# Patient Record
Sex: FEMALE | Race: OTHER | Employment: UNEMPLOYED | URBAN - METROPOLITAN AREA
[De-identification: names, ages, dates, MRNs, and addresses within clinical notes are randomized per-mention and may not be internally consistent; named-entity substitution may affect disease eponyms.]

---

## 2023-03-03 ENCOUNTER — INITIAL PRENATAL (OUTPATIENT)
Dept: OBGYN CLINIC | Age: 33
End: 2023-03-03

## 2023-03-03 VITALS
WEIGHT: 148.8 LBS | SYSTOLIC BLOOD PRESSURE: 127 MMHG | DIASTOLIC BLOOD PRESSURE: 85 MMHG | HEIGHT: 64 IN | BODY MASS INDEX: 25.4 KG/M2

## 2023-03-03 DIAGNOSIS — Z3A.33 33 WEEKS GESTATION OF PREGNANCY: Primary | ICD-10-CM

## 2023-03-03 DIAGNOSIS — R31.9 HEMATURIA, UNSPECIFIED TYPE: ICD-10-CM

## 2023-03-03 LAB
ANTIBODY SCREEN, EXTERNAL: NEGATIVE
HBSAG, EXTERNAL: NEGATIVE
HIV, EXTERNAL: NORMAL
RPR, EXTERNAL: NORMAL
RUBELLA, EXTERNAL: NORMAL
TYPE, ABO & RH, EXTERNAL: NORMAL

## 2023-03-03 RX ORDER — MULTIVITAMIN
1 TABLET ORAL DAILY
COMMUNITY

## 2023-03-03 NOTE — PROGRESS NOTES
Current pregnancy history:    Voncille Burkitt is a No obstetric history on file. ,  33 y.o. female / Patient's last menstrual period was 2022. .  She presents as transfer OB from [facility] and has previously been seen through the pregnancy with no problems thus far. Last Pap: has not had pap smear. LMP history:  The date of her LMP is 8969 fairly certain. Her last menstrual period was normal and lasted for 4 to 5 days. She was not on the pill at conception. Based on her LMP, her EDC is 2023 and her EGA is 33 weeks,1 days. Her menstrual cycles are regular and occur approximately every 28 days  and range from 3 to 5 days. The last menses lasted  the usual number of days. Ultrasound data:  She had an  ultrasound done by the ultrasound tech today which revealed a viable carvajal pregnancy with a gestational age of 29 weeks and 1 days giving an [address] of 2023. LIMITED OB SCAN  A SINGLE VERTEX 33W1D IUP IS SEEN. FETAL CARDIAC MOTION OBSERVED. LIMITED ANATOMY WAS VISUALIZED AND APPEARS WNL. APPROPRIATE FETAL GROWTH IS SEEN. SIZE=DATES. LION AND PLACENTA APPEAR WITHIN NORMAL LIMITS. Pregnancy symptoms:  She states she has had no problems with the pregnancy thus far. She has had several ultrasounds     She states she has gained weight:  Not sure of initial weight    Relevant past pregnancy history:   She has the following pregnancy history: Her last pregnancy was uncomplicated. She has no history of  delivery. Relevant past medical history:(relevant to this pregnancy): noncontributory. Her occupation is: unemployed. Substance history: negative for alcohol, tobacco and street drugs. Positive for nothing. Exposure history: There is/are no indoor cat/s in the home. The patient was instructed to not change the cat litter. She admits close contact with children on a regular basis.    She has had chicken pox or the vaccine in the past.   Patient denies issues with domestic violence. Genetic Screening/Teratology Counseling: (Includes patient, baby's father, or anyone in either family with:)  3.  Patient's age >/= [address] [facility]?-- no  .   2. Thalassemia ([facility] [place], [address], or  background): MCV<80?--no.     3.  Neural tube defect (meningomyelocele, spina bifida, anencephaly)?--no.   4.  Congenital heart defect?--no.  5.  Down syndrome?--no.   6.  Tom-S[employer]s ([employer] [place])?--no.   7.  Canavan's Disease?--no.   8.  Familial Dysautonomia?--no.   9.  Sickle cell disease or trait ()? --no   The patient has not been tested for sickle trait  10. Hemophilia or other blood disorders?--no. 11.  Muscular dystrophy?--no. 12.  Cystic fibrosis?--no. 13.  [place]'s Chorea?--no. 14.  Mental retardation/autism (if yes was person tested for Fragile X)?--no. 15.  Other inherited genetic or chromosomal disorder?--no. 12.  Maternal metabolic disorder (DM, PKU, etc)?--no. 17.  Patient or FOB with a child with a birth defect not listed above?--no.  17a. Patient or FOB with a birth defect themselves?--no. 18.  Recurrent pregnancy loss, or stillbirth?--no. 19.  Any medications since LMP other than prenatal vitamins (include vitamins, supplements, OTC meds, drugs, alcohol)?--no. 20.  Any other genetic/environmental exposure to discuss?--no. Infection History:  1. Lives with someone with TB or TB exposed?--no.   2.  Patient or partner has history of genital herpes?--no.  3.  Rash or viral illness since LMP?--no.    4.  History of STD (GC, CT, HPV, syphilis, HIV)? --no   5. Other: OTHER? Past Medical History:   Diagnosis Date    Anemia     Vitamin D deficiency      History reviewed. No pertinent surgical history.   Social History     Occupational History    Not on file   Tobacco Use    Smoking status: Never    Smokeless tobacco: Never   Vaping Use    Vaping Use: Never used   Substance and Sexual Activity Alcohol use: Not Currently     Alcohol/week: 1.0 standard drink     Types: 1 Glasses of wine per week    Drug use: Never    Sexual activity: Yes     Partners: Male     Birth control/protection: None     Family History   Problem Relation Age of Onset    Breast Cancer Mother 54     OB History    Para Term  AB Living   1 0 0 0 0 0   SAB IAB Ectopic Molar Multiple Live Births   0 0 0 0 0 0      # Outcome Date GA Lbr Jude/2nd Weight Sex Delivery Anes PTL Lv   1 Current              No Known Allergies  Prior to Admission medications    Medication Sig Start Date End Date Taking? Authorizing Provider   ferrous sulfate (IRON PO) Take  by mouth. Yes Provider, Historical   multivitamin (ONE A DAY) tablet Take 1 Tablet by mouth daily.    Yes Provider, Historical        Review of Systems: History obtained from the patient  Constitutional: negative for weight loss, fever, night sweats  HEENT: negative for hearing loss, earache, congestion, snoring, sore throat  CV: negative for chest pain, palpitations, edema  Resp: negative for cough, shortness of breath, wheezing  Breast: negative for breast lumps, nipple discharge, galactorrhea  GI: negative for change in bowel habits, abdominal pain, black or bloody stools  : negative for frequency, dysuria, hematuria, vaginal discharge  MSK: negative for back pain, joint pain, muscle pain  Skin: negative for itching, rash, hives  Neuro: negative for dizziness, headache, confusion, weakness  Psych: negative for anxiety, depression, change in mood  Heme/lymph: negative for bleeding, bruising, pallor    Objective:  Visit Vitals  /85   Wt 148 lb 12.8 oz (67.5 kg)   LMP 2022       Physical Exam:     Constitutional  Appearance: well-nourished, well developed, alert, in no acute distress    HENT  Head  Face: appears normal  Eyes: appear normal  Ears: normal  Mouth: normal  Lips: no lesions    Gastrointestinal  Abdominal Examination: abdomen non-tender to palpation, normal bowel sounds, no masses present, gravid with FH of 33  Liver and spleen: no hepatomegaly present, spleen not palpable  Hernias: no hernias identified    Skin  General Inspection: no rash, no lesions identified    Neurologic/Psychiatric  Mental Status:  Orientation: grossly oriented to person, place and time  Mood and Affect: mood normal, affect appropriate    Assessment:     ICD-10-CM ICD-9-CM    1. 33 weeks gestation of pregnancy  Z3A.33 V22.2 TYPE & SCREEN      HEP B SURFACE AG      HIV 1/2 AG/AB, 4TH GENERATION,W RFLX CONFIRM      CBC W/O DIFF      RUBELLA AB, IGG      RPR      VITAMIN D, 25 HYDROXY      HEPATITIS C AB      VITAMIN D, 25 HYDROXY      HEPATITIS C AB        Intrauterine pregnancy with the following problems identified:   Dating by LMP ~= 33 week US  Transfer care from [address] [place] [address]:     Course of pregnancy discussed including visit schedule, routine U/S, glucola testing, etc.  Avoid alcoholic beverages and illicit/recreational drugs use  Take prenatal vitamins or folic acid daily. Hospital and practice style discussed with coverage system. Discussed nutrition, toxoplasmosis precautions, sexual activity, exercise, need for influenza vaccine, environmental and work hazards, travel advice, screen for domestic violence, need for seat belts. Discussed seafood, unpasteurized dairy products, deli meat, artificial sweeteners, and caffeine. Information on prenatal classes/breastfeeding given. Information on circumcision given  Patient encouraged not to smoke. Discussed current prescription drug use. Given medication list.  Discussed the use of over the counter medications and chemicals. Route of delivery discussed, including risks, benefits, and alternatives of  versus repeat LTCS. Pt understands risk of hemorrhage during pregnancy and post delivery and would accept blood products if necessary in life-threatening emergencies  Return in about 2 weeks (around 3/17/2023). Handouts given to pt.

## 2023-03-03 NOTE — PROGRESS NOTES
Jens Dumas is a 33 y.o. female presents for a new pregnancy visit. Chief Complaint   Patient presents with    Initial Prenatal Visit         Patient's last menstrual period was 2022. Last Pap: has not had pap smear. LMP history:  The date of her LMP is  not  certain. Her last menstrual period was normal and lasted for 4 to 5 days. She was not on the pill at conception. Based on her LMP, her EDC is 2023 and her EGA is 33 weeks,1 days. Her menstrual cycles are regular and occur approximately every 28 days  and range from 3 to 5 days. The last menses lasted  the usual number of days. Ultrasound data:  She had an  ultrasound done by the ultrasound tech today which revealed a viable carvajal pregnancy with a gestational age of 29 weeks and 1 days giving an [facility] of 2023. LIMITED OB SCAN  A SINGLE VERTEX 33W1D IUP IS SEEN. FETAL CARDIAC MOTION OBSERVED. LIMITED ANATOMY WAS VISUALIZED AND APPEARS WNL. APPROPRIATE FETAL GROWTH IS SEEN. SIZE=DATES. LION AND PLACENTA APPEAR WITHIN NORMAL LIMITS. Pregnancy symptoms:    Since her LMP she has experienced  urinary frequency, breast tenderness, and nausea. She has not been vomiting over the last few weeks. Associated signs and symptoms which she denies: dysuria, discharge, vaginal bleeding. She states she has gained weight:  Approximately 5 pounds over the last few weeks. Relevant past pregnancy history:   She has the following pregnancy history:    She has no history of  delivery. Relevant past medical history:(relevant to this pregnancy): noncontributory. Her occupation is: homemaker. 1. Have you been to the ER, urgent care clinic, or hospitalized since your last visit? N/A NP    2. Have you seen or consulted any other health care providers outside of the [address] [place] [address] since your last visit?   N/A NP    Examination chaperoned by Halina Reyes MA.

## 2023-03-05 LAB
25(OH)D3+25(OH)D2 SERPL-MCNC: 46.6 NG/ML (ref 30–100)
ABO GROUP BLD: NORMAL
BLD GP AB SCN SERPL QL: NEGATIVE
ERYTHROCYTE [DISTWIDTH] IN BLOOD BY AUTOMATED COUNT: 13.4 % (ref 11.7–15.4)
HBV SURFACE AG SERPL QL IA: NEGATIVE
HCT VFR BLD AUTO: 38.5 % (ref 34–46.6)
HCV IGG SERPL QL IA: NON REACTIVE
HGB BLD-MCNC: 13 G/DL (ref 11.1–15.9)
HIV 1+2 AB+HIV1 P24 AG SERPL QL IA: NON REACTIVE
MCH RBC QN AUTO: 29.2 PG (ref 26.6–33)
MCHC RBC AUTO-ENTMCNC: 33.8 G/DL (ref 31.5–35.7)
MCV RBC AUTO: 87 FL (ref 79–97)
PLATELET # BLD AUTO: 242 X10E3/UL (ref 150–450)
RBC # BLD AUTO: 4.45 X10E6/UL (ref 3.77–5.28)
RH BLD: POSITIVE
RPR SER QL: NON REACTIVE
RUBV IGG SERPL IA-ACNC: 1.57 INDEX
WBC # BLD AUTO: 9.5 X10E3/UL (ref 3.4–10.8)

## 2023-03-21 ENCOUNTER — ROUTINE PRENATAL (OUTPATIENT)
Dept: OBGYN CLINIC | Age: 33
End: 2023-03-21
Payer: MEDICAID

## 2023-03-21 VITALS — BODY MASS INDEX: 25.75 KG/M2 | WEIGHT: 150 LBS | DIASTOLIC BLOOD PRESSURE: 79 MMHG | SYSTOLIC BLOOD PRESSURE: 127 MMHG

## 2023-03-21 DIAGNOSIS — Z3A.35 35 WEEKS GESTATION OF PREGNANCY: Primary | ICD-10-CM

## 2023-03-21 LAB — GRBS, EXTERNAL: NEGATIVE

## 2023-03-21 PROCEDURE — 0502F SUBSEQUENT PRENATAL CARE: CPT | Performed by: OBSTETRICS & GYNECOLOGY

## 2023-03-21 NOTE — PROGRESS NOTES
OB VISIT      Current EGA:   35w5d    Visit Notes:  Doing Well. +Fetal Movement. No Ucs. No LOF or VB. For GBS today  Total weight gain is 22 lb (9.979 kg). Problem List:  Dating by LMP ~= 33 week US  Transfer care from [place] [address] 33w1d = 34w3d growth 83% had    Visit Diagnoses:    ICD-10-CM ICD-9-CM    1. 35 weeks gestation of pregnancy  Z3A.35 V22.2 GROUP B STREP BY SARAH        Follow Up:  Return in about 1 week (around 3/28/2023) for Routine OB Visit.     Lucian Archer MD  03/21/23  10:58 AM

## 2023-03-23 LAB — GP B STREP DNA SPEC QL NAA+PROBE: NEGATIVE

## 2023-03-30 ENCOUNTER — ROUTINE PRENATAL (OUTPATIENT)
Dept: OBGYN CLINIC | Age: 33
End: 2023-03-30
Payer: MEDICAID

## 2023-03-30 VITALS — BODY MASS INDEX: 26.19 KG/M2 | DIASTOLIC BLOOD PRESSURE: 78 MMHG | SYSTOLIC BLOOD PRESSURE: 125 MMHG | WEIGHT: 152.6 LBS

## 2023-03-30 DIAGNOSIS — Z3A.37 37 WEEKS GESTATION OF PREGNANCY: Primary | ICD-10-CM

## 2023-03-30 PROCEDURE — 0502F SUBSEQUENT PRENATAL CARE: CPT | Performed by: OBSTETRICS & GYNECOLOGY

## 2023-03-30 NOTE — PROGRESS NOTES
Adult Self-Care for Colds  Colds are caused by viruses. They can't be cured with antibiotics. However, you can ease symptoms and support your body's efforts to heal itself.  No matter which symptoms you have, be sure to:  · Drink plenty of fluids (water or clear soup)  · Stop smoking and drinking alcohol  · Get plenty of rest    Understand a fever  · Take your temperature several times a day. If your fever is 100.4°F (38.0°C) for more than a day, call your healthcare provider.  · Relax, lie down. Go to bed if you want. Just get off your feet and rest. Also, drink plenty of fluids to avoid dehydration.  · Take acetaminophen or a nonsteroidal anti-inflammatory agent (NSAID), such as ibuprofen.  Treat a troubled nose kindly  · Breathe steam or heated humidified air to open blocked nasal passages.  a hot shower or use a vaporizer. Be careful not to get burned by the steam.  · Saline nasal sprays and decongestant tablets help open a stuffy nose. Antihistamines can also help, but they can cause side effects such as drowsiness and drying of the eyes, nose, and mouth.  Soothe a sore throat and cough  · Gargle every 2 hours with 1/4 teaspoon of salt dissolved in 1/2 cup of warm water. Suck on throat lozenges and cough drops to moisten your throat.  · Cough medicines are available but it is unclear how well they actually work.  · Take acetaminophen or an NSAID, such as ibuprofen, to ease throat pain  Ease digestive problems  · Put fluids back into your body. Take frequent sips of clear liquids such as water or broth. Avoid drinks that have a lot of sugar in them, such as juices and sodas. These can make diarrhea worse. Older children and adults can drink sports drinks.  · As your appetite returns, you can resume your normal diet. Ask your healthcare provider if there are any foods you should avoid.  When to seek medical care  When you first notice symptoms, ask your healthcare provider if antiviral medicines are  OB VISIT      Current EGA:   37w0d    Visit Notes:  Doing Well. +Fetal Movement. No Ucs. No LOF or VB. Total weight gain is 22 lb (9.979 kg). Problem List:   Dating by LMP ~= 33 week US   Transfer care from [facility]. 37w still no records available labs were done here  [address] 33w1d = 34w3d growth 83% had    Visit Diagnoses:    ICD-10-CM ICD-9-CM    1. 37 weeks gestation of pregnancy  Z3A.37 V22.2         Follow Up:  Return in about 1 week (around 4/6/2023) for Routine OB Visit.     Kenyon Rivera MD  03/30/23  11:17 AM appropriate. Antibiotics should not be taken for colds or flu. Also, call your healthcare provider if you have any of the following symptoms or if you aren't feeling better after 7 days:  · Shortness of breath  · Pain or pressure in the chest or belly (abdomen)  · Worsening symptoms, especially after a period of improvement  · Fever of 100.4°F  (38.0°C) or higher, or fever that doesn't go down with medicine  · Sudden dizziness or confusion  · Severe or continued vomiting  · Signs of dehydration, including extreme thirst, dark urine, infrequent urination, dry mouth  · Spotted, red, or very sore throat   Date Last Reviewed: 12/1/2016  © 6887-8305 [employer]. [address] [place] [address], [place] [address]. All rights reserved. This information is not intended as a substitute for professional medical care. Always follow your healthcare professional's instructions.        How to Quit Smoking  Smoking is one of the hardest habits to break. About half of all people who have ever smoked have been able to quit. Most people who still smoke want to quit. Here are some of the best ways to stop smoking.    Keep trying  Most smokers make many attempts at quitting before they are successful. Its important not to give up.  Go cold turkey  Most former smokers quit cold turkey (all at once). Trying to cut back gradually doesn't seem to work as well, perhaps because it continues the smoking habit. Also, it is possible to inhale more while smoking fewer cigarettes. This results in the same amount of nicotine in your body.  Get support  Support programs can be a big help, especially for heavy smokers. These groups offer lectures, ways to change behavior, and peer support. Here are some ways to find a support program:  · Free national quitline: 800-QUIT-NOW (501-299-4500).  · Hospital quit-smoking programs.  · American Lung Association: (374.971.9588).  · American Cancer Society (258-838-2539).  Support at home is important  "too. Nonsmokers can offer praise and encouragement. If the smoker in your life finds it hard to quit, encourage them to keep trying.  Over-the-counter medicines  Nicotine replacement therapy may make quitting easier. Certain aids, such as the nicotine patch, gum, and lozenges, are available without a prescription. It is best to use these under a doctors care, though. The skin patch provides a steady supply of nicotine. Nicotine gum and lozenges give temporary bursts of low levels of nicotine. Both methods reduce the craving for cigarettes. Warning: If you have nausea, vomiting, dizziness, weakness, or a fast heartbeat, stop using these products and see your doctor.  Prescription medicines  After reviewing your smoking patterns and past attempts to quit, your doctor may offer a prescription medicine such as bupropion, varenicline, a nicotine inhaler, or nasal spray. Each has advantages and side effects. Your doctor can review these with you.  Health benefits of quitting  The benefits of quitting start right away and keep improving the longer you go without smoking. These benefits occur at any age.  So whether you are 17 or 70, quitting is a good decision. Some of the benefits include:  · 20 minutes: Blood pressure and pulse return to normal.  · 8 hours: Oxygen levels return to normal.  · 2 days: Ability to smell and taste begin to improve as damaged nerves regrow.  · 2 to 3 weeks: Circulation and lung function improve.  · 1 to 9 months: Coughing, congestion, and shortness of breath decrease; tiredness decreases.  · 1 year: Risk of heart attack decreases by half.  · 5 years: Risk of lung cancer decreases by half; risk of stroke becomes the same as a nonsmokers.  For more on how to quit smoking, try these online resources:   · Smokefree.gov  · "Clearing the Air" booklet from the National Cancer [place]: smokefree.gov/sites/default/files/pdf/clearing-the-air-accessible.pdf  Date Last Reviewed: 3/1/2017  © 0999-6182 " [employer]. [address] [place] [address], [place] [address]. All rights reserved. This information is not intended as a substitute for professional medical care. Always follow your healthcare professional's instructions.

## 2023-04-06 ENCOUNTER — ROUTINE PRENATAL (OUTPATIENT)
Dept: OBGYN CLINIC | Age: 33
End: 2023-04-06
Payer: MEDICAID

## 2023-04-06 PROCEDURE — 0502F SUBSEQUENT PRENATAL CARE: CPT | Performed by: OBSTETRICS & GYNECOLOGY

## 2023-04-06 NOTE — PROGRESS NOTES
OB VISIT      Current EGA:   38w0d    Visit Notes:  Doing Well. +Fetal Movement. No Ucs. No LOF or VB. Having some pelvic pressure. Total weight gain is 25 lb 9.6 oz (11.6 kg). Problem List:  Dating by LMP ~= 33 week US  Transfer care from [facility]. 37w still no records available labs were done here  [place] - [address] 33w1d = 34w3d growth 83% had    Visit Diagnoses:    ICD-10-CM ICD-9-CM    1. 38 weeks gestation of pregnancy  Z3A.38 V22.2         Follow Up:  Return in about 1 week (around 4/13/2023) for Routine OB Visit, OB Ultrasound.     Ioana Landis MD  04/06/23  4:04 PM

## 2023-04-17 ENCOUNTER — HOSPITAL ENCOUNTER (EMERGENCY)
Age: 33
Discharge: HOME | DRG: 560 | End: 2023-04-17
Attending: OBSTETRICS & GYNECOLOGY | Admitting: OBSTETRICS & GYNECOLOGY
Payer: MEDICAID

## 2023-04-17 VITALS — BODY MASS INDEX: 25.78 KG/M2 | HEIGHT: 64 IN | WEIGHT: 151 LBS

## 2023-04-17 PROCEDURE — 99283 EMERGENCY DEPT VISIT LOW MDM: CPT

## 2023-04-17 NOTE — ED PROVIDER NOTES
NIKO Evaluation    Name: Ling Martin MRN: [id number]  SSN: [id number]    YOB: 1990  Age: 33 y.o. Sex: female      Subjective:     Reason for Admission:  Pregnancy and spotting    History of Present Illness: Ling Martin is a 33 y.o.  female with an estimated gestational age of 43w3d with Estimated Date of Delivery: 23. Patient complains of spotting at home a few hours ago. Denied any heavy bleeding, contractions, SROM, nausea, or vomiting. OB History          1    Para   0    Term   0       0    AB   0    Living   0         SAB   0    IAB   0    Ectopic   0    Molar   0    Multiple   0    Live Births   0              Past Medical History:   Diagnosis Date    Anemia     Vitamin D deficiency      History reviewed. No pertinent surgical history. Social History     Occupational History    Not on file   Tobacco Use    Smoking status: Never    Smokeless tobacco: Never   Vaping Use    Vaping Use: Never used   Substance and Sexual Activity    Alcohol use: Not Currently     Alcohol/week: 1.0 standard drink     Types: 1 Glasses of wine per week    Drug use: Never    Sexual activity: Yes     Partners: Male     Birth control/protection: None     Family History   Problem Relation Age of Onset    Breast Cancer Mother 54       No Known Allergies  Prior to Admission medications    Medication Sig Start Date End Date Taking? Authorizing Provider   ferrous sulfate (IRON PO) Take  by mouth. Yes Provider, Historical   multivitamin (ONE A DAY) tablet Take 1 Tablet by mouth daily. Yes Provider, Historical        Review of Systems-see HPI    Objective:     Vitals:    Vitals:    23   Weight: 68.5 kg (151 lb)   Height: 5' 4\" (1.626 m)      No data recorded.     No data recorded     Physical Exam  Vagina- no blood  Cervical Exam: 0 cm dilated    60% effaced    0 station    Presenting Part: cephalic  Cervical Position: posterior  Uterine Activity: None  Membranes: Intact  Fetal Heart Rate: Reactive       Labs: No results found for this or any previous visit (from the past 24 hour(s)). There is no problem list on file for this patient. Assessment and Plan:   Impression: False labor at 39 weeks 4 days. No evidence of bleeding. Plan: Discharge home with followup this week with Dr. Merry Epley.         Signed By:  Shawn Hamilton MD     April 17, 2023

## 2023-04-18 ENCOUNTER — ANESTHESIA (OUTPATIENT)
Dept: LABOR AND DELIVERY | Age: 33
End: 2023-04-18
Payer: MEDICAID

## 2023-04-18 ENCOUNTER — HOSPITAL ENCOUNTER (INPATIENT)
Age: 33
LOS: 3 days | Discharge: HOME | DRG: 560 | End: 2023-04-21
Attending: OBSTETRICS & GYNECOLOGY | Admitting: OBSTETRICS & GYNECOLOGY
Payer: MEDICAID

## 2023-04-18 ENCOUNTER — ANESTHESIA EVENT (OUTPATIENT)
Dept: LABOR AND DELIVERY | Age: 33
End: 2023-04-18
Payer: MEDICAID

## 2023-04-18 ENCOUNTER — TELEPHONE (OUTPATIENT)
Dept: OBGYN CLINIC | Age: 33
End: 2023-04-18

## 2023-04-18 LAB
BASOPHILS # BLD: 0 K/UL (ref 0–0.1)
BASOPHILS NFR BLD: 0 % (ref 0–1)
DIFFERENTIAL METHOD BLD: ABNORMAL
EOSINOPHIL # BLD: 0 K/UL (ref 0–0.4)
EOSINOPHIL NFR BLD: 0 % (ref 0–7)
ERYTHROCYTE [DISTWIDTH] IN BLOOD BY AUTOMATED COUNT: 14 % (ref 11.5–14.5)
HCT VFR BLD AUTO: 36.8 % (ref 35–47)
HGB BLD-MCNC: 12.5 G/DL (ref 11.5–16)
IMM GRANULOCYTES # BLD AUTO: 0.1 K/UL (ref 0–0.04)
IMM GRANULOCYTES NFR BLD AUTO: 1 % (ref 0–0.5)
LYMPHOCYTES # BLD: 0.9 K/UL (ref 0.8–3.5)
LYMPHOCYTES NFR BLD: 7 % (ref 12–49)
MCH RBC QN AUTO: 29.5 PG (ref 26–34)
MCHC RBC AUTO-ENTMCNC: 34 G/DL (ref 30–36.5)
MCV RBC AUTO: 86.8 FL (ref 80–99)
MONOCYTES # BLD: 0.4 K/UL (ref 0–1)
MONOCYTES NFR BLD: 3 % (ref 5–13)
NEUTS SEG # BLD: 10.8 K/UL (ref 1.8–8)
NEUTS SEG NFR BLD: 89 % (ref 32–75)
NRBC # BLD: 0 K/UL (ref 0–0.01)
NRBC BLD-RTO: 0 PER 100 WBC
PLATELET # BLD AUTO: 195 K/UL (ref 150–400)
PMV BLD AUTO: 11.3 FL (ref 8.9–12.9)
RBC # BLD AUTO: 4.24 M/UL (ref 3.8–5.2)
WBC # BLD AUTO: 12.2 K/UL (ref 3.6–11)

## 2023-04-18 PROCEDURE — 59025 FETAL NON-STRESS TEST: CPT

## 2023-04-18 PROCEDURE — 99284 EMERGENCY DEPT VISIT MOD MDM: CPT

## 2023-04-18 PROCEDURE — 74011250637 HC RX REV CODE- 250/637: Performed by: ADVANCED PRACTICE MIDWIFE

## 2023-04-18 PROCEDURE — 74011000250 HC RX REV CODE- 250: Performed by: NURSE ANESTHETIST, CERTIFIED REGISTERED

## 2023-04-18 PROCEDURE — 36415 COLL VENOUS BLD VENIPUNCTURE: CPT

## 2023-04-18 PROCEDURE — 77030014125 HC TY EPDRL BBMI -B: Performed by: ANESTHESIOLOGY

## 2023-04-18 PROCEDURE — 75410000002 HC LABOR FEE PER 1 HR: Performed by: OBSTETRICS & GYNECOLOGY

## 2023-04-18 PROCEDURE — 65270000029 HC RM PRIVATE

## 2023-04-18 PROCEDURE — 85025 COMPLETE CBC W/AUTO DIFF WBC: CPT

## 2023-04-18 PROCEDURE — 74011250636 HC RX REV CODE- 250/636: Performed by: OBSTETRICS & GYNECOLOGY

## 2023-04-18 PROCEDURE — 74011250636 HC RX REV CODE- 250/636: Performed by: ADVANCED PRACTICE MIDWIFE

## 2023-04-18 PROCEDURE — 00HU33Z INSERTION OF INFUSION DEVICE INTO SPINAL CANAL, PERCUTANEOUS APPROACH: ICD-10-PCS | Performed by: ANESTHESIOLOGY

## 2023-04-18 RX ORDER — BUPIVACAINE HYDROCHLORIDE 2.5 MG/ML
INJECTION, SOLUTION EPIDURAL; INFILTRATION; INTRACAUDAL AS NEEDED
Status: DISCONTINUED | OUTPATIENT
Start: 2023-04-18 | End: 2023-04-19 | Stop reason: HOSPADM

## 2023-04-18 RX ORDER — OXYTOCIN/RINGER'S LACTATE 30/500 ML
0-20 PLASTIC BAG, INJECTION (ML) INTRAVENOUS
Status: DISCONTINUED | OUTPATIENT
Start: 2023-04-18 | End: 2023-04-21 | Stop reason: HOSPADM

## 2023-04-18 RX ORDER — LIDOCAINE HYDROCHLORIDE AND EPINEPHRINE 15; 5 MG/ML; UG/ML
INJECTION, SOLUTION EPIDURAL AS NEEDED
Status: DISCONTINUED | OUTPATIENT
Start: 2023-04-18 | End: 2023-04-19 | Stop reason: HOSPADM

## 2023-04-18 RX ORDER — MAG HYDROX/ALUMINUM HYD/SIMETH 200-200-20
30 SUSPENSION, ORAL (FINAL DOSE FORM) ORAL
Status: DISCONTINUED | OUTPATIENT
Start: 2023-04-18 | End: 2023-04-19 | Stop reason: HOSPADM

## 2023-04-18 RX ORDER — NALOXONE HYDROCHLORIDE 0.4 MG/ML
0.4 INJECTION, SOLUTION INTRAMUSCULAR; INTRAVENOUS; SUBCUTANEOUS AS NEEDED
Status: DISCONTINUED | OUTPATIENT
Start: 2023-04-18 | End: 2023-04-19 | Stop reason: HOSPADM

## 2023-04-18 RX ORDER — SWAB
1 SWAB, NON-MEDICATED MISCELLANEOUS DAILY
COMMUNITY

## 2023-04-18 RX ORDER — OXYTOCIN/RINGER'S LACTATE 30/500 ML
10 PLASTIC BAG, INJECTION (ML) INTRAVENOUS AS NEEDED
Status: DISCONTINUED | OUTPATIENT
Start: 2023-04-18 | End: 2023-04-21 | Stop reason: HOSPADM

## 2023-04-18 RX ORDER — ACETAMINOPHEN 325 MG/1
650 TABLET ORAL
Status: DISCONTINUED | OUTPATIENT
Start: 2023-04-18 | End: 2023-04-21 | Stop reason: HOSPADM

## 2023-04-18 RX ORDER — FENTANYL/BUPIVACAINE/NS/PF 2-1250MCG
10 SYRINGE (ML) EPIDURAL CONTINUOUS
Status: DISCONTINUED | OUTPATIENT
Start: 2023-04-18 | End: 2023-04-19 | Stop reason: HOSPADM

## 2023-04-18 RX ORDER — SODIUM CHLORIDE, SODIUM LACTATE, POTASSIUM CHLORIDE, CALCIUM CHLORIDE 600; 310; 30; 20 MG/100ML; MG/100ML; MG/100ML; MG/100ML
125 INJECTION, SOLUTION INTRAVENOUS CONTINUOUS
Status: DISCONTINUED | OUTPATIENT
Start: 2023-04-18 | End: 2023-04-21 | Stop reason: HOSPADM

## 2023-04-18 RX ORDER — OXYTOCIN/RINGER'S LACTATE 30/500 ML
87.3 PLASTIC BAG, INJECTION (ML) INTRAVENOUS AS NEEDED
Status: DISCONTINUED | OUTPATIENT
Start: 2023-04-18 | End: 2023-04-21 | Stop reason: HOSPADM

## 2023-04-18 RX ORDER — ONDANSETRON 2 MG/ML
4 INJECTION INTRAMUSCULAR; INTRAVENOUS
Status: DISCONTINUED | OUTPATIENT
Start: 2023-04-18 | End: 2023-04-19 | Stop reason: HOSPADM

## 2023-04-18 RX ORDER — ACETAMINOPHEN 500 MG
TABLET ORAL DAILY
COMMUNITY

## 2023-04-18 RX ORDER — EPHEDRINE SULFATE/0.9% NACL/PF 50 MG/5 ML
10 SYRINGE (ML) INTRAVENOUS
Status: DISCONTINUED | OUTPATIENT
Start: 2023-04-18 | End: 2023-04-19 | Stop reason: HOSPADM

## 2023-04-18 RX ADMIN — Medication 10 ML/HR: at 16:24

## 2023-04-18 RX ADMIN — OXYTOCIN 2 MILLI-UNITS/MIN: 10 INJECTION INTRAVENOUS at 22:36

## 2023-04-18 RX ADMIN — SODIUM CHLORIDE, POTASSIUM CHLORIDE, SODIUM LACTATE AND CALCIUM CHLORIDE 999 ML: 600; 310; 30; 20 INJECTION, SOLUTION INTRAVENOUS at 14:55

## 2023-04-18 RX ADMIN — SODIUM CHLORIDE, POTASSIUM CHLORIDE, SODIUM LACTATE AND CALCIUM CHLORIDE 999 ML/HR: 600; 310; 30; 20 INJECTION, SOLUTION INTRAVENOUS at 16:00

## 2023-04-18 RX ADMIN — ACETAMINOPHEN 650 MG: 325 TABLET ORAL at 21:46

## 2023-04-18 RX ADMIN — LIDOCAINE HYDROCHLORIDE AND EPINEPHRINE 3 ML: 15; 5 INJECTION, SOLUTION EPIDURAL at 15:49

## 2023-04-18 RX ADMIN — BUPIVACAINE HYDROCHLORIDE 6 ML: 2.5 INJECTION, SOLUTION EPIDURAL; INFILTRATION; INTRACAUDAL; PERINEURAL at 15:53

## 2023-04-18 NOTE — PROGRESS NOTES
1900: SBAR report given to this RN and Sadaf KOLB per [employer]. This RN orienting Sadaf KOLB. 2130: RN updating Oscar MARVIN on pt recent temperature of 99.7.

## 2023-04-18 NOTE — PROGRESS NOTES
1931- pt presents with c/o spotting on toilet tissue when using restroom at 1900. Not active bleeding, pt used restroom here and no bleeding on toilet tissue  1950- Dr. Jayne Duarte notified of pt arrival   2016- I have reviewed discharge instructions with the patient. The patient verbalized understanding.

## 2023-04-18 NOTE — ANESTHESIA PREPROCEDURE EVALUATION
Relevant Problems   No relevant active problems       Anesthetic History   No history of anesthetic complications            Review of Systems / Medical History  Patient summary reviewed, nursing notes reviewed and pertinent labs reviewed    Pulmonary  Within defined limits                 Neuro/Psych   Within defined limits           Cardiovascular  Within defined limits                Exercise tolerance: >4 METS     GI/Hepatic/Renal  Within defined limits              Endo/Other        Anemia     Other Findings              Physical Exam    Airway  Mallampati: II  TM Distance: < 4 cm  Neck ROM: normal range of motion   Mouth opening: Normal     Cardiovascular    Rhythm: regular           Dental  No notable dental hx       Pulmonary  Breath sounds clear to auscultation               Abdominal         Other Findings            Anesthetic Plan    ASA: 2  Anesthesia type: epidural      Post-op pain plan if not by surgeon: indwelling epidural catheter      Anesthetic plan and risks discussed with: Patient      Late entry - preop done, questions answered, and consent obtained prior to procedure; note entered after procedure at 4:05 PM

## 2023-04-18 NOTE — TELEPHONE ENCOUNTER
Two patient identifiers used      33year old patient  39w5d pregnant    Patient was seen in the er last night for vaginal bleeding and sent home    Patient reports the vaginal bleeding is heavier and she is having pressure /pain in her lower pelvis    This nurse described contractions and patient not sure if she is having contraction pain , but reports coming every 6-10 minutes and she rates them at 7-8 on the pain scale of 1-10    Patient denies ROM, and is feeling the baby move    Dr Pamella Stafford reviewed er notes and patient was advised to come to the office at 4;00PM and was placed on the scheduled     Patient to increase her po fluids and monitor for contractions    Patient verbalized understanding

## 2023-04-18 NOTE — TELEPHONE ENCOUNTER
Pt calling back at 1314 to report that she is now having contractions every 3-4 minutes. Dr Aaliyah Gonzalez was informed and he states that he wants there to go to L&D now. Pt was made aware and had no further questions.

## 2023-04-18 NOTE — ANESTHESIA PROCEDURE NOTES
Epidural Block    Patient location during procedure: OB  Start time: 4/18/2023 3:35 PM  End time: 4/18/2023 3:54 PM  Reason for block: labor epidural  Staffing  Performed: CRNA   Resident/CRNA: Drew Pardo CRNA  Preanesthetic Checklist  Completed: patient identified, IV checked, risks and benefits discussed, monitors and equipment checked, pre-op evaluation and timeout performed  Block Placement  Patient position: sitting  Prep: DuraPrep  Sterility prep: cap, drape, mask and gloves  Sedation level: no sedation  Patient monitoring: continuous pulse oximetry, ETCO2 and heart rate  Approach: midline  Location: lumbar  Lumbar location: L2-L3  Epidural  Loss of resistance technique: air  Guidance: landmark technique  Needle  Needle type: Tuohy   Needle gauge: 17 G  Needle length: 10 cm  Needle insertion depth: 7 cm  Catheter size: 20 G  Catheter at skin depth: 12 cm  Catheter securement method: clear occlusive dressing and surgical tape  Test dose: negative  Assessment  Number of attempts: 2  Additional Notes  Risks and benefits of epidural discussed with patient prior to placement. Patient verbalized understanding of risks for bleeding, PDPH, infection and misplaced catheter. States she wishes to proceed with placement. Tolerated procedure well. First attempt threaded catheter to return blood. Pulled and retried up one level.

## 2023-04-18 NOTE — PROGRESS NOTES
1400: Pt arrived to L&D room 209 for complaints of contractions that began today around 0100 and have been every 5 minutes, currently 8-9/10 pain in lower abdomen. Reports vaginal bleeding for which she has had to change her pad twice, none noted on current chux pad. Denies LOF, HA, vision changes. 1410: SVE per this RN, 3-4/80/-2. Dr. Gino Amaya updated, Norberto Valecnia to admit pt for labor. 32 61 16: Brianne CRNA at bedside for epidural placement, pt positioned on side of bed    1538: Timeout    1552: Test dose     1715: SVE per Dr. Gino Amaya, 5/90/-2.     1910: Bedside and Verbal shift change report given to Deb/Yamilex RN (oncoming nurse) by LEEANNE Granda/Aleksandr RN (offgoing nurse). Report included the following information SBAR, Kardex, Procedure Summary, Intake/Output, MAR, Accordion, Recent Results, and Med Rec Status.

## 2023-04-19 PROBLEM — Z3A.39 39 WEEKS GESTATION OF PREGNANCY: Status: ACTIVE | Noted: 2023-04-18

## 2023-04-19 PROCEDURE — 75410000002 HC LABOR FEE PER 1 HR: Performed by: OBSTETRICS & GYNECOLOGY

## 2023-04-19 PROCEDURE — 74011000250 HC RX REV CODE- 250: Performed by: NURSE ANESTHETIST, CERTIFIED REGISTERED

## 2023-04-19 PROCEDURE — 75410000003 HC RECOV DEL/VAG/CSECN EA 0.5 HR: Performed by: OBSTETRICS & GYNECOLOGY

## 2023-04-19 PROCEDURE — 65270000029 HC RM PRIVATE

## 2023-04-19 PROCEDURE — 77010026065 HC OXYGEN MINIMUM MEDICAL AIR: Performed by: OBSTETRICS & GYNECOLOGY

## 2023-04-19 PROCEDURE — 74011250637 HC RX REV CODE- 250/637: Performed by: OBSTETRICS & GYNECOLOGY

## 2023-04-19 PROCEDURE — 4A1HXCZ MONITORING OF PRODUCTS OF CONCEPTION, CARDIAC RATE, EXTERNAL APPROACH: ICD-10-PCS | Performed by: OBSTETRICS & GYNECOLOGY

## 2023-04-19 PROCEDURE — 75410000000 HC DELIVERY VAGINAL/SINGLE: Performed by: OBSTETRICS & GYNECOLOGY

## 2023-04-19 PROCEDURE — 59410 OBSTETRICAL CARE: CPT | Performed by: OBSTETRICS & GYNECOLOGY

## 2023-04-19 PROCEDURE — 74011250636 HC RX REV CODE- 250/636: Performed by: ADVANCED PRACTICE MIDWIFE

## 2023-04-19 PROCEDURE — 0KQM0ZZ REPAIR PERINEUM MUSCLE, OPEN APPROACH: ICD-10-PCS | Performed by: OBSTETRICS & GYNECOLOGY

## 2023-04-19 PROCEDURE — 76060000078 HC EPIDURAL ANESTHESIA: Performed by: NURSE ANESTHETIST, CERTIFIED REGISTERED

## 2023-04-19 PROCEDURE — 10907ZC DRAINAGE OF AMNIOTIC FLUID, THERAPEUTIC FROM PRODUCTS OF CONCEPTION, VIA NATURAL OR ARTIFICIAL OPENING: ICD-10-PCS | Performed by: OBSTETRICS & GYNECOLOGY

## 2023-04-19 RX ORDER — PROMETHAZINE HYDROCHLORIDE 25 MG/1
25 TABLET ORAL
Status: DISCONTINUED | OUTPATIENT
Start: 2023-04-19 | End: 2023-04-21 | Stop reason: HOSPADM

## 2023-04-19 RX ORDER — OXYTOCIN/RINGER'S LACTATE 30/500 ML
87.3 PLASTIC BAG, INJECTION (ML) INTRAVENOUS AS NEEDED
Status: DISCONTINUED | OUTPATIENT
Start: 2023-04-19 | End: 2023-04-21 | Stop reason: HOSPADM

## 2023-04-19 RX ORDER — DIPHENHYDRAMINE HCL 25 MG
25 CAPSULE ORAL
Status: DISCONTINUED | OUTPATIENT
Start: 2023-04-19 | End: 2023-04-21 | Stop reason: HOSPADM

## 2023-04-19 RX ORDER — ACETAMINOPHEN 325 MG/1
650 TABLET ORAL
Status: DISCONTINUED | OUTPATIENT
Start: 2023-04-19 | End: 2023-04-19 | Stop reason: SDUPTHER

## 2023-04-19 RX ORDER — HYDROCODONE BITARTRATE AND ACETAMINOPHEN 7.5; 325 MG/1; MG/1
1 TABLET ORAL
Status: DISCONTINUED | OUTPATIENT
Start: 2023-04-19 | End: 2023-04-21 | Stop reason: HOSPADM

## 2023-04-19 RX ORDER — OXYTOCIN/RINGER'S LACTATE 30/500 ML
10 PLASTIC BAG, INJECTION (ML) INTRAVENOUS AS NEEDED
Status: DISCONTINUED | OUTPATIENT
Start: 2023-04-19 | End: 2023-04-21 | Stop reason: HOSPADM

## 2023-04-19 RX ORDER — FOLIC ACID/MULTIVIT,IRON,MINER 0.4MG-18MG
1 TABLET ORAL DAILY
Status: DISCONTINUED | OUTPATIENT
Start: 2023-04-19 | End: 2023-04-21 | Stop reason: HOSPADM

## 2023-04-19 RX ORDER — NALOXONE HYDROCHLORIDE 0.4 MG/ML
0.4 INJECTION, SOLUTION INTRAMUSCULAR; INTRAVENOUS; SUBCUTANEOUS AS NEEDED
Status: DISCONTINUED | OUTPATIENT
Start: 2023-04-19 | End: 2023-04-21 | Stop reason: HOSPADM

## 2023-04-19 RX ORDER — IBUPROFEN 800 MG/1
800 TABLET ORAL EVERY 8 HOURS
Status: DISCONTINUED | OUTPATIENT
Start: 2023-04-19 | End: 2023-04-21 | Stop reason: HOSPADM

## 2023-04-19 RX ORDER — MINERAL OIL
OIL (ML) ORAL
Status: DISPENSED
Start: 2023-04-19 | End: 2023-04-19

## 2023-04-19 RX ORDER — HYDROCORTISONE ACETATE PRAMOXINE HCL 2.5; 1 G/100G; G/100G
CREAM TOPICAL AS NEEDED
Status: DISCONTINUED | OUTPATIENT
Start: 2023-04-19 | End: 2023-04-19 | Stop reason: RX

## 2023-04-19 RX ORDER — ZOLPIDEM TARTRATE 5 MG/1
5 TABLET ORAL
Status: DISCONTINUED | OUTPATIENT
Start: 2023-04-19 | End: 2023-04-21 | Stop reason: HOSPADM

## 2023-04-19 RX ADMIN — Medication 10 ML/HR: at 00:57

## 2023-04-19 RX ADMIN — IBUPROFEN 800 MG: 800 TABLET, FILM COATED ORAL at 10:01

## 2023-04-19 RX ADMIN — OXYTOCIN 6 MILLI-UNITS/MIN: 10 INJECTION INTRAVENOUS at 00:25

## 2023-04-19 RX ADMIN — Medication 1 TABLET: at 10:01

## 2023-04-19 RX ADMIN — IBUPROFEN 800 MG: 800 TABLET, FILM COATED ORAL at 17:43

## 2023-04-19 NOTE — L&D DELIVERY NOTE
Delivery Summary    Patient: Heber Melchor MRN: [id number]  SSN: [id number]    YOB: 1990  Age: 33 y.o. Sex: female      Baby noted in MELISSA position +2 station. Patient had pushed nearly 4 hours and was exhausted. Options discussed vacuum vs. C/Section. Risks of hematomas, lacerations with vacuum reviewed. Parents agreed to trial of vacuum. Attempts were made to drain bladder but head was too low. Pt delivered in 2 Ucs (4 pulls) with no popoffs. Total application time of vacuum 100s. Delivery was successful with no visible scalp lacerations. [facility] staff was present. 2nd degree laceration resulted and was repaired with 3-0 vicryl. No complications resulted.       Vacuum was applied  Information for the patient's :  Bear White [[id number]]     Labor Events:    Labor: No    Steroids: None   Cervical Ripening Date/Time:       Cervical Ripening Type: None   Antibiotics During Labor: No   Rupture Identifier:      Rupture Date/Time: 2023 1:10 AM   Rupture Type: AROM   Amniotic Fluid Volume:      Amniotic Fluid Description: Clear    Amniotic Fluid Odor:      Induction: None       Induction Date/Time:        Indications for Induction:      Augmentation: AROM   Augmentation Date/Time: :10 AM   Indications for Augmentation: Ineffective Contraction Pattern   Labor complications: None       Additional complications:        Delivery Events:  Indications For Episiotomy:     Episiotomy: None   Perineal Laceration(s): 2nd   Repaired: Yes   Periurethral Laceration Location:      Repaired:     Labial Laceration Location:     Repaired:     Sulcal Laceration Location:     Repaired:     Vaginal Laceration Location:     Repaired:     Cervical Laceration Location:     Repaired:     Repair Suture: Vicryl 3-0   Number of Repair Packets: 1   Estimated Blood Loss (ml):  ml   Quantitative Blood Loss (ml):                Delivery Date: 2023    Delivery Time: 8:22 AM    Delivery Type: Vaginal, Vacuum (Extractor)  Vacuum attempted? No    Vacuum indication: Prolonged Labor   Vacuum type: Kiwi   Application location: Low   First Attempt     Time applied: 2023  8:18 AM   Time removed: 2023  8:22 AM   Second Attempt    Time applied:     Time removed: Third Attempt    Time applied:     Time removed:     Number of pulls: 4   Number of pop-offs: 0   Low-end pressure range:     High-end pressure range: Total application time: 632 seconds   Applied by: Jaylene White MD   Failed? No     Sex:  Male     Gestational Age: 37w11d  Delivery Clinician:  Noni Casarez Ii  Living Status: Living   Delivery Location: L&D            APGARS  One minute Five minutes Ten minutes   Skin color: 1   1        Heart rate: 2   2        Grimace: 2   2        Muscle tone: 2   2        Breathin   2        Totals: 9   9          Presentation: Vertex    Position: Left Occiput Anterior  Resuscitation Method:  Suctioning-bulb; Tactile Stimulation     Meconium Stained: Terminal      Cord Information: 3 Vessels  Complications: Nuchal Cord With Compressions  Cord around: head  Delayed cord clamping? Yes  Cord clamped date/time:2023  8:23 AM  Disposition of Cord Blood: Discard    Blood Gases Sent?: No    Placenta:  Date/Time: 2023  8:34 AM  Removal: Expressed      Appearance: Normal     Corder Measurements:  Birth Weight: 8 lb 1.1 oz (3.66 kg)      Birth Length: 1' 8.5\" (0.521 m)      Head Circumference: 1' 2.57\" (0.37 m)      Chest Circumference: 1' 1.78\" (0.35 m)     Abdominal Girth: 1' 1.39\" (0.34 m)    Other Providers:   IDANIA CHEW II;YOJANA COLLAZO;OLGA TILLMAN;YOJANA COLLAZO;NELA MARIE;ARTEM LAU;CASSY BRADFORD, Midwife;Primary Nurse;Primary Corder Nurse;Primary Nurse;Charge Nurse;Neonatologist;Nicu Nurse     The indication, risks, and benefits of vacuum delivery compared to  delivery were discussed with the patient and attendant family member.  All questions were answered. Bladder was drained prior to instrumentation. Instrumentation easily achieved and positioning was checked and verified prior to attempt at deliver. Group B Strep:   Lab Results   Component Value Date/Time    GrROSELIAtrep, External Negative 2023 12:00 AM     Information for the patient's :  Alex Garay Male [place] Citizen [[id number]]   No results found for: ABORH, PCTABR, PCTDIG, BILI, ABORHEXT, ABORH   No results for input(s): PCO2CB, PO2CB, HCO3I, SO2I, IBD, PTEMPI, SPECTI, PHICB, ISITE, IDEV, IALLEN in the last 72 hours.

## 2023-04-19 NOTE — H&P
History & Physical    Name: Francisca Roque MRN: [id number]  SSN: [id number]    YOB: 1990  Age: 33 y.o. Sex: female        Subjective:     Estimated Date of Delivery: 23    Ms. Valerie Wall is admitted with pregnancy at 39w5d for active labor with bloody show. Prenatal course was normal. Please see prenatal records for details. Group B Strep was negative. Past Medical History:  Patient Active Problem List    Diagnosis    Labor and delivery indication for care or intervention     Past Medical History:   Diagnosis Date    Anemia     Vitamin D deficiency      No past surgical history on file. OB History    Para Term  AB Living   1 0 0 0 0 0   SAB IAB Ectopic Molar Multiple Live Births   0 0 0 0 0 0      # Outcome Date GA Lbr Jude/2nd Weight Sex Delivery Anes PTL Lv   1 Current              Gyn Flowsheet:  No flowsheet data found. Social History     Socioeconomic History    Marital status:     Number of children: 0   Tobacco Use    Smoking status: Never    Smokeless tobacco: Never   Vaping Use    Vaping Use: Never used   Substance and Sexual Activity    Alcohol use: Not Currently     Alcohol/week: 1.0 standard drink     Types: 1 Glasses of wine per week    Drug use: Never    Sexual activity: Yes     Partners: Male     Birth control/protection: None      Family History   Problem Relation Age of Onset    Breast Cancer Mother 54     No current facility-administered medications on file prior to encounter. Current Outpatient Medications on File Prior to Encounter   Medication Sig Dispense Refill    prenatal vit-iron fumarate-fa (PRENATAL PLUS with IRON) 28 mg iron- 800 mcg tab Take 1 Tablet by mouth daily. cholecalciferol (VITAMIN D3) (2,000 UNITS /50 MCG) cap capsule Take  by mouth daily. ferrous sulfate (IRON PO) Take  by mouth.        No Known Allergies    Review of Systems: A comprehensive review of systems was negative except for that written in the HPI.    Objective:     Vitals:  Vitals:    04/19/23 0226 04/19/23 0330 04/19/23 0630 04/19/23 0800   BP: 118/77   131/72   Pulse: (!) 107   88   Resp:    18   Temp:  98.5 °F (36.9 °C) 99.1 °F (37.3 °C) 99.3 °F (37.4 °C)   SpO2:    95%   Weight:       Height:            Physical Exam:  Patient with mild distress.   Heart: Regular rate and rhythm  Lung: normal respiratory effort  Abdomen: soft, nontender  Fundus: soft and non tender  Cervical Exam: 3/50/-3/vtx  Membranes:  Intact  Fetal Heart Rate: Reactive Cat 1    Prenatal Labs:   Lab Results   Component Value Date/Time    Rubella, External Immune 03/03/2023 12:00 AM    GrBStrep, External Negative 03/21/2023 12:00 AM    HBsAg, External Negative 03/03/2023 12:00 AM    HIV, External Non-Reactive 03/03/2023 12:00 AM    RPR, External Non-Reactive 03/03/2023 12:00 AM        Assessment/Plan:   ASSESSMENT  Active Problems:    Labor and delivery indication for care or intervention (4/18/2023)      39 weeks gestation of pregnancy (4/18/2023)       PLAN  Admit for delivery       Signed By:  Chris Gillette MD     April 19, 2023

## 2023-04-19 NOTE — PROGRESS NOTES
0700 Bedside and Verbal shift change report given to RICKI Sawyer RN (oncoming nurse) by NOE Kulkarni and ANASTACIA Hermosillo RN (offgoing nurse). Report included the following information SBAR, Kardex, Procedure Summary, Intake/Output, MAR, and Recent Results. 8587 decel noted post contraction w/ pushing. Pt placed on maternal L side, bolus started, and O2 provided to mom. 0800 RN and Idalia Mcnally MD to bedside to assess pt. POC discussed with pt. Pt agreeable to MD recommendation of vacuum at this time. 0818 vacuum placed at this time. See delivery summary for details     9903 RN remained at bedside throughout pushing. EFM continuously assessed. Vaginal delivery of viable infant. 1100  TRANSFER - OUT REPORT:    Verbal report given to CLOVER Mcnally RN(name) on Yasmany Wynn  being transferred to MIU(unit) for routine progression of care       Report consisted of patients Situation, Background, Assessment and   Recommendations(SBAR). Information from the following report(s) SBAR, Kardex, Procedure Summary, Intake/Output, MAR, and Recent Results was reviewed with the receiving nurse. Lines:   Peripheral IV 04/18/23 Anterior;Distal;Right Forearm (Active)   Site Assessment Clean, dry, & intact 04/19/23 0845   Phlebitis Assessment 0 04/19/23 0845   Infiltration Assessment 0 04/19/23 0845   Dressing Status Clean, dry, & intact 04/19/23 0845   Dressing Type Tape;Transparent 04/19/23 0845   Hub Color/Line Status Infusing 04/19/23 0845        Opportunity for questions and clarification was provided.       Patient transported with:   Registered Nurse

## 2023-04-19 NOTE — PROGRESS NOTES
1900: SBAR report received from [facility] and [employer] RN, pt care assumed at this time. 2130[facility] CNM updated about pts temperature. 2210:  CNM reviewing EFM/ [place] tracing. CNM putting in an order for Pitocin titration. 0107: CNM at bedside. 0110: CNM performed SVE 8/90/0. AROM performed by CNM. 5Trupert Macias RN performing SVE due to FHR intervention and pt reporting increased pressure, SVE 10/100/+1. RN noting small to moderate amount of bleeding on pt nubia-pad. Geri Edge RN calling CNM to update of SVE, EFM tracing, and noted nubia-pad.     0451: Patient actively pushing. RN remains in continuous attendance at the bedside. Assessment & evaluation of fetal heart rate ongoing via continuous EFM.     0602: CNM at bedside. 6931: CNM leaving bedside. 8166: CNM at bedside. 0592: CNM leaving bedside. 0700: SBAR given to [employer] at pt bedside, care handed over at this time.

## 2023-04-19 NOTE — LACTATION NOTE
This note was copied from a baby's chart. Mom states baby didn't latch well her first BF. Mom assisted to both sidelying and prone position. Baby able to maintain latch briefly with some breast support before delatching (smacking, clicking indicating repetitive breaks in suction). Nipple shield introduced for right nipple which inverts with stimulation. Baby able to maintain deep latch. Reviewed to evaluate for colostrum transfer in shield. Hand expression taught to parents and MOB encouraged to protect supply by manual breast expression and offer drops of colostrum for non feeds. Normal  behaviors and output expectations reviewed. Breastfeeding booklet provided to parents. Discussed with mother her plan for feeding. Reviewed the benefits of exclusive breast milk feeding during the hospital stay. Informed her of the risks of using formula to supplement in the first few days of life as well as the benefits of successful breast milk feeding; referred her to the Breastfeeding booklet about this information. She acknowledges understanding of information reviewed and states that it is her plan to breastfeed her infant. Will support her choice and offer additional information as needed. Reviewed breastfeeding basics:  How milk is made and normal  breastfeeding behaviors discussed. Supply and demand,  stomach size, early feeding cues, skin to skin bonding with comfortable positioning and baby led latch-on reviewed. How to identify signs of successful breastfeeding sessions reviewed; education on asymetrical latch, signs of effective latching vs shallow, in-effective latching, normal  feeding frequency and duration and expected infant output discussed.   Normal course of breastfeeding discussed including the AAP's recommendation that children receive exclusive breast milk feedings for the first six months of life with breast milk feedings to continue through the first year of life and/or beyond as complimentary table foods are added. Breastfeeding Booklet and Warm line information provided with discussion. Discussed typical  weight loss and the importance of pediatrician appointment within 24-48 hours of discharge, at 2 weeks of life and normalcy of requesting pediatric weight checks as needed in between visits. Hand Expression Education:  Mom taught how to manually hand express her colostrum. Emphasized the importance of providing infant with valuable colostrum as infant rests skin to skin at breast.  Aware to avoid extended periods of non-feeding. Aware to offer 10-20+ drops of colostrum every 2-3 hours until infant is latching and nursing effectively. Taught the rationale behind this low tech but highly effective evidence based practice. Sidelying:  Taught mom to  lie on side, facing baby. Arm under pillow for comfort. . Baby's chest should face mother's chest, and baby's nose should be level with nipple. Try to position baby so their ear, shoulder, and hip are in one line. This will help them get milk more easily. Pull baby close. In this position, mom can cradle  baby's back with her forearm and guide them to latch. Pt will successfully establish breastfeeding by feeding in response to early feeding cues   or wake every 3h, will obtain deep latch, and will keep log of feedings/output. Taught to BF at hunger cues and or q 2-3 hrs and to offer 10-20 drops of hand expressed colostrum at any non-feeds. Breast Assessment  Left Breast: Medium, Large  Left Nipple: Everted, Intact  Right Breast: Medium, Large  Right Nipple:  Intact, Inverted  Breast- Feeding Assessment  Breast-Feeding Experience: No  Type/Quality: Good  Lactation Consultant Visits  Breast-Feedings: Good   Mother/Infant Observation  Mother Observation: Alignment, Breast comfortable, Close hold, Lets baby end feeding, Holds breast, Nipple round on release, Recognizes feeding cues  Infant Observation: Lips flanged, upper, Lips flanged, lower, Breast tissue moves, Feeding cues, Latches nipple and aereolae, Opens mouth, Relaxed after feeding  LATCH Documentation  Latch: Grasps breast, tongue down, lips flanged, rhythmic sucking (with shield)  Audible Swallowing: None  Type of Nipple: Everted (after stimulation) (everted inverted)  Comfort (Breast/Nipple): Soft/non-tender  Hold (Positioning): Full assist, teach one side, mother does other, staff holds  [facility] Score: 7

## 2023-04-20 LAB
ERYTHROCYTE [DISTWIDTH] IN BLOOD BY AUTOMATED COUNT: 14.5 % (ref 11.5–14.5)
HCT VFR BLD AUTO: 28 % (ref 35–47)
HGB BLD-MCNC: 9.3 G/DL (ref 11.5–16)
MCH RBC QN AUTO: 29.7 PG (ref 26–34)
MCHC RBC AUTO-ENTMCNC: 33.2 G/DL (ref 30–36.5)
MCV RBC AUTO: 89.5 FL (ref 80–99)
NRBC # BLD: 0 K/UL (ref 0–0.01)
NRBC BLD-RTO: 0 PER 100 WBC
PLATELET # BLD AUTO: 169 K/UL (ref 150–400)
PMV BLD AUTO: 10.7 FL (ref 8.9–12.9)
RBC # BLD AUTO: 3.13 M/UL (ref 3.8–5.2)
WBC # BLD AUTO: 14.4 K/UL (ref 3.6–11)

## 2023-04-20 PROCEDURE — 36415 COLL VENOUS BLD VENIPUNCTURE: CPT

## 2023-04-20 PROCEDURE — 74011250637 HC RX REV CODE- 250/637: Performed by: OBSTETRICS & GYNECOLOGY

## 2023-04-20 PROCEDURE — 65270000029 HC RM PRIVATE

## 2023-04-20 PROCEDURE — 85027 COMPLETE CBC AUTOMATED: CPT

## 2023-04-20 RX ADMIN — IBUPROFEN 800 MG: 800 TABLET, FILM COATED ORAL at 18:01

## 2023-04-20 RX ADMIN — IBUPROFEN 800 MG: 800 TABLET, FILM COATED ORAL at 01:54

## 2023-04-20 RX ADMIN — IBUPROFEN 800 MG: 800 TABLET, FILM COATED ORAL at 10:09

## 2023-04-20 RX ADMIN — Medication 1 TABLET: at 10:09

## 2023-04-20 NOTE — PROGRESS NOTES
Post-Partum Day Number 1    Progress note post vaginal delivery    Pt has no unusual postpartum complaints. Pain is well controlled with current medications. The baby is doing well. Urinary output is adequate. Voiding without difficulty. The patient is ambulating well. Tolerating a regular diet. Vitals:  Patient Vitals for the past 8 hrs:   BP Temp Pulse Resp SpO2   23 0837 118/78 98.1 °F (36.7 °C) 73 16 96 %     Temp (24hrs), Av.2 °F (36.8 °C), Min:97.9 °F (36.6 °C), Max:98.5 °F (36.9 °C)      Exam:  Patient without distress. Abdomen soft, fundus firm,  nontender               Perineum with normal lochia noted. Lower extremities are negative for swelling, cords or tenderness. Labs:   Recent Results (from the past 24 hour(s))   CBC W/O DIFF    Collection Time: 23  4:21 AM   Result Value Ref Range    WBC 14.4 (H) 3.6 - 11.0 K/uL    RBC 3.13 (L) 3.80 - 5.20 M/uL    HGB 9.3 (L) 11.5 - 16.0 g/dL    HCT 28.0 (L) 35.0 - 47.0 %    MCV 89.5 80.0 - 99.0 FL    MCH 29.7 26.0 - 34.0 PG    MCHC 33.2 30.0 - 36.5 g/dL    RDW 14.5 11.5 - 14.5 %    PLATELET 309 011 - 022 K/uL    MPV 10.7 8.9 - 12.9 FL    NRBC 0.0 0  WBC    ABSOLUTE NRBC 0.00 0.00 - 0.01 K/uL       Assessment:     Status post vaginal delivery. Doing well postpartum day 1.     Plan:     Routine postpartum care

## 2023-04-20 NOTE — LACTATION NOTE
This note was copied from a baby's chart. Assisted parents with positioning and latching baby at breast.  Baby latched well with shield in prone position with rhythmic sucks noted. Reviewed breastfeeding techniques and positions with mother until found a position she was most comfortable with. Reminded mother of early feeding cues and that breast fed infants should be fed on demand without time restriction on the first breast until the infant seems satisfied. Then the second breast is offered. Advised mother to awaken  to feed if three hours have passed since baby last ate. Will continue to monitor mother's progress with breastfeeding and offer assistance at any time. Pt will successfully establish breastfeeding by feeding in response to early feeding cues   or wake every 3h, will obtain deep latch, and will keep log of feedings/output. Taught to BF at hunger cues and or q 2-3 hrs and to offer 10-20 drops of hand expressed colostrum at any non-feeds. Breast Assessment  Left Breast: Medium, Large  Left Nipple: Everted, Intact  Right Breast: Medium, Large  Right Nipple:  Intact  Breast- Feeding Assessment  Breast-Feeding Experience: No  Type/Quality: Good  Lactation Consultant Visits  Breast-Feedings: Good   Mother/Infant Observation  Mother Observation: Breast comfortable, Holds breast, Lets baby end feeding, Nipple round on release, Recognizes feeding cues  Infant Observation: Rhythmic suck, Relaxed after feeding, Opens mouth, Lips flanged, upper, Lips flanged, lower, Latches nipple and aereolae, Feeding cues  LATCH Documentation  Latch: Grasps breast, tongue down, lips flanged, rhythmic sucking  Audible Swallowing: A few with stimulation  Type of Nipple: Everted (after stimulation)  Comfort (Breast/Nipple): Soft/non-tender  Hold (Positioning): Full assist, teach one side, mother does other, staff holds (showed father how to help support baby's head)  LATCH Score: 8

## 2023-04-21 VITALS
RESPIRATION RATE: 16 BRPM | HEART RATE: 76 BPM | SYSTOLIC BLOOD PRESSURE: 123 MMHG | BODY MASS INDEX: 25.78 KG/M2 | OXYGEN SATURATION: 99 % | HEIGHT: 64 IN | DIASTOLIC BLOOD PRESSURE: 73 MMHG | TEMPERATURE: 98.2 F | WEIGHT: 151 LBS

## 2023-04-21 PROCEDURE — 74011250637 HC RX REV CODE- 250/637: Performed by: OBSTETRICS & GYNECOLOGY

## 2023-04-21 RX ORDER — IBUPROFEN 800 MG/1
800 TABLET ORAL
Qty: 60 TABLET | Refills: 0 | Status: SHIPPED | OUTPATIENT
Start: 2023-04-21 | End: 2023-05-21

## 2023-04-21 RX ADMIN — IBUPROFEN 800 MG: 800 TABLET, FILM COATED ORAL at 01:55

## 2023-04-21 NOTE — LACTATION NOTE
This note was copied from a baby's chart. Assisted mother with positioning and latching baby at breast.  Baby fussy at breast.  Mother hand expressing milk to entice baby to latch. Multiple different positions attempted and sweet ease used to get baby to latch and suck rhythmically. Once baby latched well with rhythmic sucks, swallows were noted. Encouraged mother to keep working with baby at breast as long as baby is awake or showing feeding cues. Lots of info reviewed and printed info given. Chart shows numerous feedings, void, stool WNL. Discussed importance of monitoring outputs and feedings on first week of life. Discussed ways to tell if baby is  getting enough breast milk, ie  voids and stools, change in color of stool, and return to birth wt within 2 weeks. Follow up with pediatrician visit for weight check in 1-2 days (per AAP guidelines.)  Encouraged to call Warm Line  [phone]  for any questions/problems that arise. Mother also given breastfeeding support group dates and times for any future needs     Engorgement Care Guidelines:  Reviewed how milk is made and normal phases of milk production. Taught care of engorged breasts - physiologic breastfeeding encouraged with use of cool packs (no ice directly on skin). Consider use of NSAIDS where appropriate for discomfort and inflammation. Can employ light touch, lymphatic drainage techniques on tender grandular tissues. Anticipatory guidance shared. Pt will successfully establish breastfeeding by feeding in response to early feeding cues   or wake every 3h, will obtain deep latch, and will keep log of feedings/output. Taught to BF at hunger cues and or q 2-3 hrs and to offer 10-20 drops of hand expressed colostrum at any non-feeds.       Breast Assessment  Left Breast: Medium, Large  Left Nipple: Everted, Intact  Right Breast: Medium, Large  Right Nipple: Everted, Intact  Breast- Feeding Assessment  Breast-Feeding Experience: No  Type/Quality: Good  Lactation Consultant Visits  Breast-Feedings: Fair  Mother/Infant Observation  Mother Observation: Breast comfortable, Close hold, Holds breast, Lets baby end feeding, Nipple round on release  Infant Observation: Rhythmic suck, Relaxed after feeding, Lips flanged, upper, Lips flanged, lower, Opens mouth, Latches nipple and aereolae, Audible swallows, Feeding cues  LATCH Documentation  Latch: Grasps breast, tongue down, lips flanged, rhythmic sucking  Audible Swallowing: Spontaneous and intermittent (24 hours old)  Type of Nipple: Everted (after stimulation)  Comfort (Breast/Nipple): Soft/non-tender  Hold (Positioning): Full assist, teach one side, mother does other, staff holds  [facility] Score: 9

## 2023-04-21 NOTE — PROGRESS NOTES
Post-Partum Day Number 2    Progress note post vaginal delivery    Patient doing well post-partum without significant complaint. Voiding without difficulty, normal lochia, positive flatus. Vitals:  Patient Vitals for the past 8 hrs:   BP Temp Pulse Resp SpO2   23 0709 123/73 98.2 °F (36.8 °C) 76 16 99 %     Temp (24hrs), Av.2 °F (36.8 °C), Min:97.9 °F (36.6 °C), Max:98.7 °F (37.1 °C)      Vital signs stable, afebrile. Exam:  Patient without distress. Abdomen soft, fundus firm,  nontender               Perineum with normal lochia noted. Lower extremities are negative for swelling, cords or tenderness. Labs: No results found for this or any previous visit (from the past 24 hour(s)). Assessment:     Status post: vaginal delivery. Doing well postpartum day 2. Plan:     Routine postpartum care. Plan discharge for today with follow up in our office in 6 weeks. See discharge summary.

## 2023-04-21 NOTE — ROUTINE PROCESS
Bedside shift change report given to oncoming RN as assigned, by CONY Lau RN, offgoing nurse. Report included SBAR, Kardex, I&Os, MAR, recent results, procedures, and changes in pt status.

## 2023-04-21 NOTE — DISCHARGE INSTRUCTIONS
After Your Delivery (the Postpartum Period): Care Instructions  Overview     Congratulations on the birth of your baby. Like pregnancy, the  period can be a time of excitement, palomo, and exhaustion. You may look at your wondrous little baby and feel happy. You may also be overwhelmed by your new sleep hours and new responsibilities. At first, babies often sleep during the days and are awake at night. They do not have a pattern or routine. They may make sudden gasps, jerk themselves awake, or look like they have crossed eyes. These are all normal, and they may even make you smile. In these first weeks after delivery, try to take good care of yourself. It may take 4 to 6 weeks to feel like yourself again, and possibly longer if you had a  birth. You will likely feel very tired for several weeks. Your days will be full of ups and downs, but lots of palomo as well. Follow-up care is a key part of your treatment and safety. Be sure to make and go to all appointments, and call your doctor if you are having problems. It's also a good idea to know your test results and keep a list of the medicines you take. How can you care for yourself at home? Take care of your body after delivery  Use pads instead of tampons for the bloody flow that may last as long as 2 weeks. Ease cramps with ibuprofen (Advil, Motrin). Ease soreness of hemorrhoids and the area between your vagina and rectum with ice compresses or witch hazel pads. Ease constipation by drinking lots of fluid and eating high-fiber foods. Ask your doctor about over-the-counter stool softeners. Cleanse yourself with a gentle squeeze of warm water from a bottle instead of wiping with toilet paper. Take a sitz bath in warm water several times a day. Wear a good nursing bra. Ease sore and swollen breasts with warm, wet washcloths. If you aren't breastfeeding, use ice rather than heat for breast soreness.   Your period may not start for several months if you are breastfeeding. You may bleed more, and longer at first, than you did before you got pregnant. Wait until you are healed (about 4 to 6 weeks) before you have sex. Ask your doctor when it is okay for you to have sex. Try not to travel with your baby for 5 or 6 weeks. If you take a long car trip, make frequent stops to walk around and stretch. Avoid exhaustion  Rest every day. Try to nap when your baby naps. Ask another adult to be with you for a few days after delivery. Plan for  if you have other children. Stay flexible so you can eat at odd hours and sleep when you need to. Both you and your baby are making new schedules. Plan small trips to get out of the house. Change can make you feel less tired. Ask for help with housework, cooking, and shopping. Remind yourself that your job is to care for your baby. Know about help for postpartum depression  \"Baby blues\" are common for the first 1 to 2 weeks after birth. You may cry or feel sad or irritable for no reason. Rest whenever you can. Being tired makes it harder to handle your emotions. Go for walks with your baby. Talk to your partner, friends, and family about your feelings. If your symptoms last for more than a few weeks, or if you feel very depressed, ask your doctor for help. Postpartum depression can be treated. Support groups and counseling can help. Sometimes medicine can also help. Stay healthy  Eat healthy foods so you have more energy. If you breastfeed, avoid drugs. If you quit smoking during pregnancy, try to stay smoke-free. If you choose to have a drink now and then, have only one drink, and limit the number of occasions that you have a drink. Wait to breastfeed at least 2 hours after you have a drink to reduce the amount of alcohol the baby may get in the milk. Start daily exercise after 4 to 6 weeks, but rest when you feel tired. Learn exercises to tone your belly.  Try Kegel exercises to regain strength in your pelvic muscles. You can do these exercises while you stand or sit. (If doing these exercises causes pain, stop doing them and talk with your doctor.)  Squeeze your muscles as if you were trying not to pass gas. Or squeeze your muscles as if you were stopping the flow of urine. Your belly, legs, and buttocks shouldn't move. Hold the squeeze for 3 seconds, then relax for 5 to 10 seconds. Start with 3 seconds, then add 1 second each week until you are able to squeeze for 10 seconds. Repeat the exercise 10 times a session. Do 3 to 8 sessions a day. Find a class for you and your baby that has an exercise time. If you had a  birth, give yourself a bit more time before you exercise, and be careful. When should you call for help? Share this information with your partner, family, or a friend. They can help you watch for warning signs. Call 911  anytime you think you may need emergency care. For example, call if:    You have thoughts of harming yourself, your baby, or another person. You passed out (lost consciousness). You have chest pain, are short of breath, or cough up blood. You have a seizure. Call your doctor now or seek immediate medical care if:    You have signs of hemorrhage (too much bleeding), such as:  Heavy vaginal bleeding. This means that you are soaking through one or more pads in an hour. Or you pass blood clots bigger than an egg. Feeling dizzy or lightheaded, or you feel like you may faint. Feeling so tired or weak that you cannot do your usual activities. A fast or irregular heartbeat. New or worse belly pain. You have signs of infection, such as:  A fever. Vaginal discharge that smells bad. New or worse belly pain. You have symptoms of a blood clot in your leg (called a deep vein thrombosis), such as:  Pain in the calf, back of the knee, thigh, or groin. Redness and swelling in your leg or groin.      You have signs of preeclampsia, such as:  Sudden swelling of your face, hands, or feet. New vision problems (such as dimness, blurring, or seeing spots). A severe headache. Watch closely for changes in your health, and be sure to contact your doctor if:    Your vaginal bleeding isn't decreasing. You feel sad, anxious, or hopeless for more than a few days. You are having problems with your breasts or breastfeeding. Where can you learn more? Go to http://www.montalvo.com/  Enter A461 in the search box to learn more about \"After Your Delivery (the Postpartum Period): Care Instructions. \"  Current as of: November 9, 2022               Content Version: 13.6  © 7703-9352 [employer]. Care instructions adapted under license by [employer] (which disclaims liability or warranty for this information). If you have questions about a medical condition or this instruction, always ask your healthcare professional. [place] [address] any warranty or liability for your use of this information.

## 2023-04-21 NOTE — DISCHARGE SUMMARY
Obstetrical Discharge Summary     Name: Yasmany Wynn MRN: [id number]  SSN: [id number]    YOB: 1990  Age: 33 y.o. Sex: female      Admit Date: 2023    Discharge Date: 2023     Admitting Physician: Ghassan Kenyon MD     Attending Physician:  Yuval Jesus MD     Admission Diagnoses: Labor and delivery indication for care or intervention [O75.9]    Discharge Diagnoses:   Information for the patient's :  Garner Hove Male Alvia Cousins [[id number]]   Delivery of a 8 lb 1.1 oz (3.66 kg) male infant via Vaginal, Vacuum (Extractor) on 2023 at 8:22 AM  by Gibran Sorenson Ii. Apgars were 9  and 9 . Additional Diagnoses:   Hospital Problems  Date Reviewed: 2023            Codes Class Noted POA    Labor and delivery indication for care or intervention ICD-10-CM: O75.9  ICD-9-CM: 659.90  2023 Yes        39 weeks gestation of pregnancy ICD-10-CM: Z3A.39  ICD-9-CM: V22.2  2023 Yes          Lab Results   Component Value Date/Time    Rubella, External Immune 2023 12:00 AM    GrBStrep, External Negative 2023 12:00 AM       Immunization(s): There is no immunization history for the selected administration types on file for this patient. Hospital Course: Normal hospital course following the delivery. The patient was released to her home in good condition. Patient Instructions:     Reference my discharge instructions. I spent 10 minutes discharging the patient in face to face contact.       Follow-up Appointments   Procedures    FOLLOW UP VISIT Appointment in: 6 Weeks PP     PP     Standing Status:   Standing     Number of Occurrences:   1     Order Specific Question:   Appointment in     Answer:   6 Weeks        Signed By:  Ghassan Kenyon MD     2023

## 2023-06-03 SDOH — ECONOMIC STABILITY: TRANSPORTATION INSECURITY
IN THE PAST 12 MONTHS, HAS LACK OF TRANSPORTATION KEPT YOU FROM MEETINGS, WORK, OR FROM GETTING THINGS NEEDED FOR DAILY LIVING?: PATIENT DECLINED

## 2023-06-03 SDOH — ECONOMIC STABILITY: FOOD INSECURITY: WITHIN THE PAST 12 MONTHS, YOU WORRIED THAT YOUR FOOD WOULD RUN OUT BEFORE YOU GOT MONEY TO BUY MORE.: PATIENT DECLINED

## 2023-06-03 SDOH — ECONOMIC STABILITY: HOUSING INSECURITY
IN THE LAST 12 MONTHS, WAS THERE A TIME WHEN YOU DID NOT HAVE A STEADY PLACE TO SLEEP OR SLEPT IN A SHELTER (INCLUDING NOW)?: PATIENT REFUSED

## 2023-06-03 SDOH — ECONOMIC STABILITY: FOOD INSECURITY: WITHIN THE PAST 12 MONTHS, THE FOOD YOU BOUGHT JUST DIDN'T LAST AND YOU DIDN'T HAVE MONEY TO GET MORE.: PATIENT DECLINED

## 2023-06-03 SDOH — ECONOMIC STABILITY: INCOME INSECURITY: HOW HARD IS IT FOR YOU TO PAY FOR THE VERY BASICS LIKE FOOD, HOUSING, MEDICAL CARE, AND HEATING?: PATIENT DECLINED

## 2023-06-06 ENCOUNTER — POSTPARTUM VISIT (OUTPATIENT)
Age: 33
End: 2023-06-06

## 2023-06-06 VITALS
DIASTOLIC BLOOD PRESSURE: 75 MMHG | SYSTOLIC BLOOD PRESSURE: 117 MMHG | HEIGHT: 64 IN | WEIGHT: 127.4 LBS | BODY MASS INDEX: 21.75 KG/M2

## 2023-06-06 PROCEDURE — 0503F POSTPARTUM CARE VISIT: CPT | Performed by: OBSTETRICS & GYNECOLOGY

## 2023-06-06 RX ORDER — VITAMIN A ACETATE, BETA CAROTENE, ASCORBIC ACID, CHOLECALCIFEROL, .ALPHA.-TOCOPHEROL ACETATE, DL-, THIAMINE MONONITRATE, RIBOFLAVIN, NIACINAMIDE, PYRIDOXINE HYDROCHLORIDE, FOLIC ACID, CYANOCOBALAMIN, CALCIUM CARBONATE, FERROUS FUMARATE, ZINC OXIDE, CUPRIC OXIDE 3080; 12; 120; 400; 1; 1.84; 3; 20; 22; 920; 25; 200; 27; 10; 2 [IU]/1; UG/1; MG/1; [IU]/1; MG/1; MG/1; MG/1; MG/1; MG/1; [IU]/1; MG/1; MG/1; MG/1; MG/1; MG/1
1 TABLET, FILM COATED ORAL DAILY
COMMUNITY

## 2023-06-06 NOTE — PROGRESS NOTES
Amalia Rubalcava is a 33 y.o. female returns for a routine post-partum follow-up visit     Chief Complaint   Patient presents with    Postpartum Care       Postpartum Depression: Not on file    EPDS score:   0    Type of delivery: vacuum-assisted vaginal delivery  Date of Delivery: April 19, 2023  Breastfeeding: no  Bleeding Resolved: started cycle 6/2/2023. Birth Control: condoms. Last Pap:  not obtained. Problems: no problems    1. Have you been to the ER, urgent care clinic, or hospitalized since your last visit? No    2. Have you seen or consulted any other health care providers outside of the [address] [place] [address] since your last visit? No    Examination chaperoned by Em Mercado CMA.

## 2023-06-06 NOTE — PROGRESS NOTES
POSTPARTUM EVALUATION      Thomas Owens is a 33y.o. year old   / [place]ia female who presents for a postpartum exam.     She is now six weeks post vacuum-assisted vaginal delivery. Her baby is doing well. Having some pain at introitus. Has not had sex  She has had on menses now. She has had the following significant problems since her delivery: none    The patient is bottle feeding without difficulty. (Stopped breast after 3weeks)    The patient would like to use condoms for birth control. She is currently taking:   Cholecalciferol Caps  FERROUS SULFATE IRON PO  Prenatal Plus Tabs     EPDS score is: 0    She is due for her next pap now (first ever)    Problem List:  Patient Active Problem List    Diagnosis Date Noted    Labor and delivery indication for care or intervention 2023    39 weeks gestation of pregnancy 2023     Past Medical History:   Diagnosis Date    Anemia     Vitamin D deficiency      History reviewed. No pertinent surgical history.   OB History    Para Term  AB Living   1 1 1 0 0 1   SAB IAB Ectopic Molar Multiple Live Births   0 0 0 0 0 1      # Outcome Date GA Lbr Rocky/2nd Weight Sex Delivery Anes PTL Lv   1 Term 23 39w6d  8 lb 1.1 oz (3.66 kg) M Vag-Vacuum EPI N MAUDE      Name: Danna Sloan History     Socioeconomic History    Marital status:      Spouse name: None    Number of children: None    Years of education: None    Highest education level: None   Tobacco Use    Smoking status: Never    Smokeless tobacco: Never   Substance and Sexual Activity    Alcohol use: Not Currently     Alcohol/week: 1.0 standard drink    Drug use: Never      Family History   Problem Relation Age of Onset    Breast Cancer Mother 54     Current Outpatient Medications on File Prior to Visit   Medication Sig Dispense Refill    Prenatal Vit-Fe Fumarate-FA (PRENATAL PLUS) 27-1 MG TABS Take 1 tablet by mouth daily      Ferrous Sulfate Dried (FERROUS

## 2025-01-07 DIAGNOSIS — N91.2 AMENORRHEA: Primary | ICD-10-CM

## 2025-01-10 ENCOUNTER — ROUTINE PRENATAL (OUTPATIENT)
Age: 35
End: 2025-01-10

## 2025-01-10 VITALS
RESPIRATION RATE: 16 BRPM | HEIGHT: 64 IN | WEIGHT: 127.4 LBS | SYSTOLIC BLOOD PRESSURE: 120 MMHG | DIASTOLIC BLOOD PRESSURE: 76 MMHG | HEART RATE: 99 BPM | BODY MASS INDEX: 21.75 KG/M2

## 2025-01-10 DIAGNOSIS — Z3A.08 8 WEEKS GESTATION OF PREGNANCY: Primary | ICD-10-CM

## 2025-01-10 DIAGNOSIS — Z01.419 ENCOUNTER FOR CERVICAL PAP SMEAR WITH PELVIC EXAM: ICD-10-CM

## 2025-01-10 LAB
ABO, EXTERNAL RESULT: NORMAL
HEP B, EXTERNAL RESULT: NEGATIVE
HEPATITIS C ANTIBODY, EXTERNAL RESULT: NON REACTIVE
HIV, EXTERNAL RESULT: NON REACTIVE
RH FACTOR, EXTERNAL RESULT: POSITIVE
RPR, EXTERNAL RESULT: NON REACTIVE
RUBELLA TITER, EXTERNAL RESULT: NORMAL

## 2025-01-10 PROCEDURE — 0500F INITIAL PRENATAL CARE VISIT: CPT | Performed by: OBSTETRICS & GYNECOLOGY

## 2025-01-10 RX ORDER — ONDANSETRON 4 MG/1
4 TABLET, FILM COATED ORAL DAILY PRN
Qty: 30 TABLET | Refills: 1 | Status: SHIPPED | OUTPATIENT
Start: 2025-01-10 | End: 2025-02-09

## 2025-01-10 NOTE — PROGRESS NOTES
Priyanka Santana is a 34 y.o. female presents for a new pregnancy visit.    Chief Complaint   Patient presents with    Initial Prenatal Visit    Blood Work    Pregnancy Ultrasound            Patient's last menstrual period was 11/20/2024 (approximate).    Last Pap: normal obtained 6/6/2023.    LMP history:  The date of her LMP is 11/20/2024 certain.  Her last menstrual period was normal and lasted for 4 to 5 days.  A urine pregnancy test was positive 2 weeks ago. She was not on the pill at conception.     Based on her LMP, her EDC is 8/27/2025 and her EGA is 7 weeks,2 days. Her menstrual cycles are regular and occur approximately every 28 days and range from 3 to 5 days. The last menses lasted  the usual number of days.      Ultrasound data:  She had an ultrasound done by the ultrasound tech today which revealed a viable seaman pregnancy with a gestational age of 8 weeks and 4 days giving an EDC of 8/18/2025.US OB LESS THAN 14 WEEKS SINGLE OR FIRST GESTATION    Result Date: 1/10/2025  Transvaginal ultrasound images are reviewed today and demonstrate a seaman live intrauterine gestation with yolk sac seen.   Average crown-rump length measurements are consistent with 8 weeks and 4 days and currently measuring 2.0 cm on average. Fetal ventricular heart rate is 179 bpm.  Left ovary is normal in appearance.  Right adnexa is normal in appearance however the ovary was not seen.  There is no free fluid in the cul-de-sac.  Dating by today's ultrasound at 8 weeks and 4 days does not confirm the patient's last period of 11/20/2024 which places her at 7 weeks and 2 days.  Due date by her ultrasound today would be 8/18/2025.       Pregnancy symptoms:    Since her LMP she has experienced urinary frequency, breast tenderness, and nausea.   She has been vomiting over the last few weeks.  Associated signs and symptoms which she denies: dysuria, discharge, vaginal bleeding.    She states she has gained weight:  Approximately 5 
no rash, no lesions identified    Neurologic/Psychiatric  Mental Status:  Orientation: grossly oriented to person, place and time  Mood and Affect: mood normal, affect appropriate    Assessment:    Diagnosis Orders   1. 8 weeks gestation of pregnancy  Type and Screen    Hepatitis B Surface Antigen    HIV 1/2 Ag/Ab, 4TH Generation,W Rflx Confirm    CBC    Rubella antibody, IgG    RPR    Hepatitis C Antibody    Vitamin D 25 Hydroxy    Hemoglobin A1C      2. Encounter for cervical Pap smear with pelvic exam  PAP IG, Aptima HPV and rfx 16/18,45 ([id number])          Intrauterine pregnancy with the following problems identified:  Dating by 8 week US <> unsure LMP  Going to [place] until 21 weeks.  Understands missing some lab work.      Plan:   Discussed NIPT  Course of pregnancy discussed including visit schedule, routine U/S, glucola testing, etc.  Avoid alcoholic beverages and illicit/recreational drugs use  Healthy Diet reviewed  Discussed seafood, unpasteurized dairy products, deli meat, artificial sweeteners, and caffeine.  Take prenatal vitamins or folic acid daily.  Hospital and practice style discussed with coverage system.  Discussed, toxoplasmosis precautions, sexual activity, exercise, need for influenza vaccine, environmental and work hazards, travel advice, screen for domestic violence, need for seat belts.  Discussed current prescription drug use. Given medication list.  Discussed the use of over the counter medications and chemicals.  Route of delivery discussed, including risks, benefits, and alternatives   Pt understands risk of hemorrhage during pregnancy and post delivery and would accept blood products if necessary in life-threatening emergencies    Patient encouraged not to smoke/use drugs    Handouts given to pt.  Information on prenatal classes/breastfeeding given.  Information on circumcision given    Return in about 3 months (around 4/17/2025) for Routine OB, OB Ultrasound.

## 2025-01-11 LAB
ERYTHROCYTE [DISTWIDTH] IN BLOOD BY AUTOMATED COUNT: 12.6 % (ref 11.7–15.4)
HBA1C MFR BLD: 5.7 % (ref 4.8–5.6)
HCT VFR BLD AUTO: 37.2 % (ref 34–46.6)
HGB BLD-MCNC: 12.2 G/DL (ref 11.1–15.9)
MCH RBC QN AUTO: 29 PG (ref 26.6–33)
MCHC RBC AUTO-ENTMCNC: 32.8 G/DL (ref 31.5–35.7)
MCV RBC AUTO: 88 FL (ref 79–97)
PLATELET # BLD AUTO: 381 X10E3/UL (ref 150–450)
RBC # BLD AUTO: 4.21 X10E6/UL (ref 3.77–5.28)
RPR SER QL: NON REACTIVE
WBC # BLD AUTO: 13.1 X10E3/UL (ref 3.4–10.8)

## 2025-01-12 LAB
25(OH)D3+25(OH)D2 SERPL-MCNC: 20.5 NG/ML (ref 30–100)
RUBV IGG SERPL IA-ACNC: 1.98 INDEX

## 2025-01-13 PROBLEM — E55.9 VITAMIN D DEFICIENCY: Status: ACTIVE | Noted: 2025-01-13

## 2025-01-13 PROBLEM — R73.03 PREDIABETES: Status: ACTIVE | Noted: 2025-01-13

## 2025-01-13 LAB
ABO GROUP BLD: NORMAL
BLD GP AB SCN SERPL QL: NEGATIVE
HBV SURFACE AG SERPL QL IA: NEGATIVE
HCV IGG SERPL QL IA: NON REACTIVE
HIV 1+2 AB+HIV1 P24 AG SERPL QL IA: NON REACTIVE
RH BLD: POSITIVE

## 2025-01-16 LAB
CYTOLOGIST CVX/VAG CYTO: NORMAL
CYTOLOGY CVX/VAG DOC CYTO: NORMAL
CYTOLOGY CVX/VAG DOC THIN PREP: NORMAL
DX ICD CODE: NORMAL
HPV GENOTYPE REFLEX: NORMAL
HPV I/H RISK 4 DNA CVX QL PROBE+SIG AMP: NEGATIVE
Lab: NORMAL
OTHER STN SPEC: NORMAL
STAT OF ADQ CVX/VAG CYTO-IMP: NORMAL

## 2025-04-14 SDOH — ECONOMIC STABILITY: INCOME INSECURITY: IN THE LAST 12 MONTHS, WAS THERE A TIME WHEN YOU WERE NOT ABLE TO PAY THE MORTGAGE OR RENT ON TIME?: NO

## 2025-04-14 SDOH — ECONOMIC STABILITY: FOOD INSECURITY: WITHIN THE PAST 12 MONTHS, THE FOOD YOU BOUGHT JUST DIDN'T LAST AND YOU DIDN'T HAVE MONEY TO GET MORE.: NEVER TRUE

## 2025-04-14 SDOH — ECONOMIC STABILITY: FOOD INSECURITY: WITHIN THE PAST 12 MONTHS, YOU WORRIED THAT YOUR FOOD WOULD RUN OUT BEFORE YOU GOT MONEY TO BUY MORE.: NEVER TRUE

## 2025-04-14 SDOH — ECONOMIC STABILITY: TRANSPORTATION INSECURITY
IN THE PAST 12 MONTHS, HAS THE LACK OF TRANSPORTATION KEPT YOU FROM MEDICAL APPOINTMENTS OR FROM GETTING MEDICATIONS?: NO

## 2025-04-14 SDOH — ECONOMIC STABILITY: TRANSPORTATION INSECURITY
IN THE PAST 12 MONTHS, HAS LACK OF TRANSPORTATION KEPT YOU FROM MEETINGS, WORK, OR FROM GETTING THINGS NEEDED FOR DAILY LIVING?: NO

## 2025-04-16 DIAGNOSIS — Z3A.22 22 WEEKS GESTATION OF PREGNANCY: Primary | ICD-10-CM

## 2025-04-17 ENCOUNTER — ROUTINE PRENATAL (OUTPATIENT)
Age: 35
End: 2025-04-17

## 2025-04-17 VITALS
SYSTOLIC BLOOD PRESSURE: 123 MMHG | DIASTOLIC BLOOD PRESSURE: 70 MMHG | HEART RATE: 97 BPM | WEIGHT: 138.8 LBS | BODY MASS INDEX: 23.82 KG/M2 | RESPIRATION RATE: 16 BRPM

## 2025-04-17 DIAGNOSIS — Z3A.22 22 WEEKS GESTATION OF PREGNANCY: Primary | ICD-10-CM

## 2025-04-17 DIAGNOSIS — R73.03 PREDIABETES: ICD-10-CM

## 2025-04-17 DIAGNOSIS — E55.9 VITAMIN D DEFICIENCY: ICD-10-CM

## 2025-04-17 PROCEDURE — 0502F SUBSEQUENT PRENATAL CARE: CPT | Performed by: OBSTETRICS & GYNECOLOGY

## 2025-04-17 ASSESSMENT — PATIENT HEALTH QUESTIONNAIRE - PHQ9
SUM OF ALL RESPONSES TO PHQ QUESTIONS 1-9: 0
1. LITTLE INTEREST OR PLEASURE IN DOING THINGS: NOT AT ALL
2. FEELING DOWN, DEPRESSED OR HOPELESS: NOT AT ALL
2. FEELING DOWN, DEPRESSED OR HOPELESS: NOT AT ALL
1. LITTLE INTEREST OR PLEASURE IN DOING THINGS: NOT AT ALL
SUM OF ALL RESPONSES TO PHQ9 QUESTIONS 1 & 2: 0

## 2025-04-17 NOTE — PROGRESS NOTES
US OB 14 PLUS WEEKS SINGLE OR FIRST GESTATION  Result Date: 4/17/2025  Transabdominal ultrasound images are reviewed today and demonstrate a seaman live intrauterine gestation in vertex presentation..  Active fetal cardiac activity is noted with a fetal ventricular heart rate of 153 bpm.  Anterior placenta is noted and appears normal with a normal cord insertion.  There is no evidence of the placenta being low-lying or previa.  Amniotic fluid volume is normal with a maximum vertical pocket  of 59mm.  Average fetal biometric measurements today are consistent with 22 weeks and 6 days which confirms previous dating.  EFW is 548 g (1 pounds 3 ounces) this is 68 percentile by Hadlock criteria.  Individual fetal biometric measurements are as follows: BPD (Hadlock) 5.70 cm 5.70 avg. 81.8% 23w3d OFD (HC) 7.05 cm 7.05 avg. HC (Hadlock) 20.74 cm 20.74 avg. 54.2% 22w6d AC (Hadlock) 18.22 cm 18.22 avg. 62.6% 23w0d FL (Hadlock) 3.96 cm 3.96 avg. 50.4% 22w5d Fetal anatomy today is well-seen and is all within normal limits.  There are no abnormalities identified.  Cervix appears to be 3.34 cm in length with no funneling noted.  Gender appears to be male.

## 2025-04-17 NOTE — PROGRESS NOTES
OB VISIT      Current EGA:   22w3d    Visit Notes:  Doing Well. +Fetal Movement.  No Ucs. No LOF or VB.   Has been in [place] since Jan 2025.   No issues   Had US today with no abnormalities  US OB 14 PLUS WEEKS SINGLE OR FIRST GESTATION  Result Date: 4/17/2025  Transabdominal ultrasound images are reviewed today and demonstrate a seaman live intrauterine gestation in vertex presentation..  Active fetal cardiac activity is noted with a fetal ventricular heart rate of 153 bpm.  Anterior placenta is noted and appears normal with a normal cord insertion.  There is no evidence of the placenta being low-lying or previa.  Amniotic fluid volume is normal with a maximum vertical pocket  of 59mm.  Average fetal biometric measurements today are consistent with 22 weeks and 6 days which confirms previous dating.  EFW is 548 g (1 pounds 3 ounces) this is 68 percentile by Hadlock criteria.  Individual fetal biometric measurements are as follows: BPD (Hadlock) 5.70 cm 5.70 avg. 81.8% 23w3d OFD (HC) 7.05 cm 7.05 avg. HC (Hadlock) 20.74 cm 20.74 avg. 54.2% 22w6d AC (Hadlock) 18.22 cm 18.22 avg. 62.6% 23w0d FL (Hadlock) 3.96 cm 3.96 avg. 50.4% 22w5d Fetal anatomy today is well-seen and is all within normal limits.  There are no abnormalities identified.  Cervix appears to be 3.34 cm in length with no funneling noted.  Gender appears to be male.      Total weight gain is 5.171 kg (11 lb 6.4 oz).   (Total expected for pregnancy is 11.5 kg (25 lb)-16 kg (35 lb))  Last Weight Metrics:      4/17/2025     1:19 PM 1/10/2025     2:45 PM 6/6/2023     4:06 PM 4/18/2023     3:05 PM 4/17/2023     7:42 PM 4/14/2023    12:58 PM 4/6/2023     4:11 PM   Weight Loss Metrics   Height  5' 4\" 5' 4\" 5' 4\" 5' 4\"     Weight - Scale 138 lbs 13 oz 127 lbs 6 oz 127 lbs 6 oz 151 lbs 151 lbs 151 lbs 10 oz 153 lbs 10 oz   BMI (Calculated) 0 kg/m2 21.9 kg/m2 21.9 kg/m2 26 kg/m2 26 kg/m2 0 kg/m2 0 kg/m2        Vitals:    04/17/25 1319   BP: 123/70   Pulse: 97

## 2025-05-15 ENCOUNTER — ROUTINE PRENATAL (OUTPATIENT)
Age: 35
End: 2025-05-15

## 2025-05-15 VITALS
WEIGHT: 141.4 LBS | SYSTOLIC BLOOD PRESSURE: 116 MMHG | HEART RATE: 89 BPM | BODY MASS INDEX: 24.27 KG/M2 | DIASTOLIC BLOOD PRESSURE: 76 MMHG

## 2025-05-15 DIAGNOSIS — E55.9 VITAMIN D DEFICIENCY: ICD-10-CM

## 2025-05-15 DIAGNOSIS — Z3A.26 26 WEEKS GESTATION OF PREGNANCY: Primary | ICD-10-CM

## 2025-05-15 DIAGNOSIS — R73.09 ELEVATED HEMOGLOBIN A1C: ICD-10-CM

## 2025-05-15 PROCEDURE — 0502F SUBSEQUENT PRENATAL CARE: CPT | Performed by: OBSTETRICS & GYNECOLOGY

## 2025-05-15 NOTE — PROGRESS NOTES
OB VISIT      Current EGA:   26w3d    Visit Notes:  Doing Well. +Fetal Movement.  No Ucs. No LOF or VB.   For glucola today    Total weight gain is 5.171 kg (11 lb 6.4 oz).   (Total expected for pregnancy is 11.5 kg (25 lb)-16 kg (35 lb))  Last Weight Metrics:      4/17/2025     1:19 PM 1/10/2025     2:45 PM 6/6/2023     4:06 PM 4/18/2023     3:05 PM 4/17/2023     7:42 PM 4/14/2023    12:58 PM 4/6/2023     4:11 PM   Weight Loss Metrics   Height  5' 4\" 5' 4\" 5' 4\" 5' 4\"     Weight - Scale 138 lbs 13 oz 127 lbs 6 oz 127 lbs 6 oz 151 lbs 151 lbs 151 lbs 10 oz 153 lbs 10 oz   BMI (Calculated) 0 kg/m2 21.9 kg/m2 21.9 kg/m2 26 kg/m2 26 kg/m2 0 kg/m2 0 kg/m2        There were no vitals filed for this visit.     Problem List:  Dating by 8 week US <> unsure LMP  Going to [place] until 21 weeks.  Understands missing some lab work.    Prediabetes A1c 5.7   Early Glucola  Vit D Def (20)  5000iu  Boy     PN Flowsheet Data:              Current Outpatient Medications   Medication Instructions    Cholecalciferol 50 MCG (2000 UT) CAPS DAILY    Ferrous Sulfate Dried (FERROUS SULFATE IRON PO) Take by mouth    Prenatal Vit-Fe Fumarate-FA (PRENATAL PLUS) 27-1 MG TABS 1 tablet, DAILY        Visit Diagnoses:   Diagnosis Orders   1. 26 weeks gestation of pregnancy        2. Elevated hemoglobin A1c            Follow Up:  No follow-ups on file.    Asa Bo MD, FACOG  05/15/25  12:49 PM

## 2025-05-16 LAB
25(OH)D3 SERPL-MCNC: 19.5 NG/ML (ref 30–100)
BASOPHILS # BLD: 0.03 K/UL (ref 0–0.1)
BASOPHILS NFR BLD: 0.4 % (ref 0–1)
DIFFERENTIAL METHOD BLD: ABNORMAL
EOSINOPHIL # BLD: 0.03 K/UL (ref 0–0.4)
EOSINOPHIL NFR BLD: 0.4 % (ref 0–7)
ERYTHROCYTE [DISTWIDTH] IN BLOOD BY AUTOMATED COUNT: 13.6 % (ref 11.5–14.5)
GLUCOSE 1H P 100 G GLC PO SERPL-MCNC: 142 MG/DL (ref 65–140)
HCT VFR BLD AUTO: 34.1 % (ref 35–47)
HGB BLD-MCNC: 11.6 G/DL (ref 11.5–16)
IMM GRANULOCYTES # BLD AUTO: 0.02 K/UL (ref 0–0.04)
IMM GRANULOCYTES NFR BLD AUTO: 0.3 % (ref 0–0.5)
LYMPHOCYTES # BLD: 1 K/UL (ref 0.8–3.5)
LYMPHOCYTES NFR BLD: 12.7 % (ref 12–49)
MCH RBC QN AUTO: 30.1 PG (ref 26–34)
MCHC RBC AUTO-ENTMCNC: 34 G/DL (ref 30–36.5)
MCV RBC AUTO: 88.6 FL (ref 80–99)
MONOCYTES # BLD: 0.39 K/UL (ref 0–1)
MONOCYTES NFR BLD: 5 % (ref 5–13)
NEUTS SEG # BLD: 6.4 K/UL (ref 1.8–8)
NEUTS SEG NFR BLD: 81.2 % (ref 32–75)
NRBC # BLD: 0 K/UL (ref 0–0.01)
NRBC BLD-RTO: 0 PER 100 WBC
PLATELET # BLD AUTO: 213 K/UL (ref 150–400)
PMV BLD AUTO: 11 FL (ref 8.9–12.9)
RBC # BLD AUTO: 3.85 M/UL (ref 3.8–5.2)
WBC # BLD AUTO: 7.9 K/UL (ref 3.6–11)

## 2025-05-19 ENCOUNTER — RESULTS FOLLOW-UP (OUTPATIENT)
Age: 35
End: 2025-05-19

## 2025-05-19 ENCOUNTER — TELEPHONE (OUTPATIENT)
Age: 35
End: 2025-05-19

## 2025-05-19 PROBLEM — R73.09 ELEVATED GLUCOSE TOLERANCE TEST: Status: ACTIVE | Noted: 2025-05-19

## 2025-05-19 NOTE — TELEPHONE ENCOUNTER
PT name and  verified    33 yo last ov 5/15/25, next ov 25  , 27w0d      RN called PT, a note was given by PC/GM to check on PT.  PT states she has had chest pain, to the L side of her chest since over the weekend. PT reports it is \"all the time\", when she sits/stands, and has only lightened up some when she sits. PT has taken Tylenol with NI, denies any sob, visual changes, no vag bleeding or lof and has been having +FM.  RN consulted with  and wants PT to go to the ER to be evaluated.  RN relayed MD's instructions and she verbalizes understanding.

## 2025-05-21 ENCOUNTER — LAB (OUTPATIENT)
Age: 35
End: 2025-05-21

## 2025-05-21 DIAGNOSIS — R73.09 ELEVATED GLUCOSE TOLERANCE TEST: Primary | ICD-10-CM

## 2025-05-22 ENCOUNTER — RESULTS FOLLOW-UP (OUTPATIENT)
Age: 35
End: 2025-05-22

## 2025-05-22 LAB
GESTATIONAL 3HR GTT: NORMAL
GLUCOSE 1H P 100 G GLC PO SERPL-MCNC: 157 MG/DL (ref 65–180)
GLUCOSE 2 HOUR: 105 MG/DL (ref 65–155)
GLUCOSE P FAST SERPL-MCNC: 74 MG/DL (ref 65–95)
GLUCOSE, 3 HOUR: 114 MG/DL (ref 65–140)

## 2025-06-05 ENCOUNTER — ROUTINE PRENATAL (OUTPATIENT)
Age: 35
End: 2025-06-05

## 2025-06-05 VITALS
OXYGEN SATURATION: 99 % | DIASTOLIC BLOOD PRESSURE: 76 MMHG | RESPIRATION RATE: 18 BRPM | HEIGHT: 64 IN | WEIGHT: 141.8 LBS | HEART RATE: 90 BPM | SYSTOLIC BLOOD PRESSURE: 116 MMHG | BODY MASS INDEX: 24.21 KG/M2

## 2025-06-05 DIAGNOSIS — Z3A.29 29 WEEKS GESTATION OF PREGNANCY: Primary | ICD-10-CM

## 2025-06-05 PROCEDURE — 0502F SUBSEQUENT PRENATAL CARE: CPT | Performed by: OBSTETRICS & GYNECOLOGY

## 2025-06-05 NOTE — PROGRESS NOTES
OB VISIT      Current EGA:   29w3d    Visit Notes:  Doing Well. +Fetal Movement.  No Ucs. No LOF or VB.   Some bhc    Total weight gain is 6.532 kg (14 lb 6.4 oz).   (Total expected for pregnancy is 11.5 kg (25 lb)-16 kg (35 lb))  Last Weight Metrics:      6/5/2025     3:04 PM 5/15/2025     1:04 PM 4/17/2025     1:19 PM 1/10/2025     2:45 PM 6/6/2023     4:06 PM 4/18/2023     3:05 PM 4/17/2023     7:42 PM   Weight Loss Metrics   Height 5' 4\"   5' 4\" 5' 4\" 5' 4\" 5' 4\"   Weight - Scale 141 lbs 13 oz 141 lbs 6 oz 138 lbs 13 oz 127 lbs 6 oz 127 lbs 6 oz 151 lbs 151 lbs   BMI (Calculated) 24.4 kg/m2 0 kg/m2 0 kg/m2 21.9 kg/m2 21.9 kg/m2 26 kg/m2 26 kg/m2        Vitals:    06/05/25 1504   BP: 116/76   Pulse: 90   Resp: 18   SpO2: 99%   Weight: 64.3 kg (141 lb 12.8 oz)   Height: 1.626 m (5' 4\")        Problem List:  Dating by 8 week US <> unsure LMP  Going to [place] until 21 weeks.  Understands missing some lab work.    Prediabetes A1c 5.7   Early Glucola  Vit D Def (20)  5000iu  Repeat at 28w 19.  Non compliant  Boy   Elevated glucola (142) Normal 3hr GTT    PN Flowsheet Data:  Prenatal Fetal Information  Fetal HR: 145  Movement: Present Albumin: Negative  Glucose: Negative Comments: doing well, leuko moderate       Current Outpatient Medications   Medication Instructions    Cholecalciferol 50 MCG (2000 UT) CAPS DAILY    Ferrous Sulfate Dried (FERROUS SULFATE IRON PO) Take by mouth    Prenatal Vit-Fe Fumarate-FA (PRENATAL PLUS) 27-1 MG TABS 1 tablet, DAILY        Visit Diagnoses:   Diagnosis Orders   1. 29 weeks gestation of pregnancy            Follow Up:  Return in about 3 weeks (around 6/26/2025) for Routine OB.    Asa Bo MD, FACOG  06/05/25  3:20 PM

## 2025-06-24 ENCOUNTER — ROUTINE PRENATAL (OUTPATIENT)
Age: 35
End: 2025-06-24

## 2025-06-24 VITALS
WEIGHT: 142.2 LBS | SYSTOLIC BLOOD PRESSURE: 108 MMHG | DIASTOLIC BLOOD PRESSURE: 69 MMHG | BODY MASS INDEX: 24.28 KG/M2 | HEART RATE: 90 BPM | RESPIRATION RATE: 18 BRPM | HEIGHT: 64 IN

## 2025-06-24 DIAGNOSIS — Z3A.32 32 WEEKS GESTATION OF PREGNANCY: Primary | ICD-10-CM

## 2025-06-24 PROCEDURE — 0502F SUBSEQUENT PRENATAL CARE: CPT | Performed by: OBSTETRICS & GYNECOLOGY

## 2025-06-24 NOTE — PROGRESS NOTES
OB VISIT      Current EGA:   32w1d    Visit Notes:  Doing Well. +Fetal Movement.  No Ucs. No LOF or VB.   Occ BHC    Total weight gain is 6.713 kg (14 lb 12.8 oz).   (Total expected for pregnancy is 11.5 kg (25 lb)-16 kg (35 lb))  Last Weight Metrics:      6/24/2025     2:34 PM 6/5/2025     3:04 PM 5/15/2025     1:04 PM 4/17/2025     1:19 PM 1/10/2025     2:45 PM 6/6/2023     4:06 PM 4/18/2023     3:05 PM   Weight Loss Metrics   Height 5' 4\" 5' 4\"   5' 4\" 5' 4\" 5' 4\"   Weight - Scale 142 lbs 3 oz 141 lbs 13 oz 141 lbs 6 oz 138 lbs 13 oz 127 lbs 6 oz 127 lbs 6 oz 151 lbs   BMI (Calculated) 24.5 kg/m2 24.4 kg/m2 0 kg/m2 0 kg/m2 21.9 kg/m2 21.9 kg/m2 26 kg/m2        Vitals:    06/24/25 1434   BP: 108/69   Pulse: 90   Resp: 18   Weight: 64.5 kg (142 lb 3.2 oz)   Height: 1.626 m (5' 4\")        Problem List:  Dating by 8 week US <> unsure LMP  Going to [place] until 21 weeks.  Understands missing some lab work.    Prediabetes A1c 5.7   Early Glucola  Vit D Def (20)  5000iu  Repeat at 28w 19.  Non compliant  Boy   Elevated glucola (142) Normal 3hr GTT    PN Flowsheet Data:  Fundal Height (cm): 32 cm  Prenatal Fetal Information  Fundal Height (cm): 32 cm  Fetal HR: 140  Movement: Present Albumin: Negative  Glucose: Negative         Current Outpatient Medications   Medication Instructions    Cholecalciferol 50 MCG (2000 UT) CAPS DAILY    Ferrous Sulfate Dried (FERROUS SULFATE IRON PO) Take by mouth    Prenatal Vit-Fe Fumarate-FA (PRENATAL PLUS) 27-1 MG TABS 1 tablet, DAILY        Visit Diagnoses:   Diagnosis Orders   1. 32 weeks gestation of pregnancy            Follow Up:  Return in about 2 weeks (around 7/8/2025) for Routine OB.    Asa Bo MD, FACOG  06/24/25  2:51 PM

## 2025-07-14 ENCOUNTER — ROUTINE PRENATAL (OUTPATIENT)
Age: 35
End: 2025-07-14

## 2025-07-14 VITALS
HEART RATE: 78 BPM | WEIGHT: 145.6 LBS | RESPIRATION RATE: 18 BRPM | BODY MASS INDEX: 24.99 KG/M2 | DIASTOLIC BLOOD PRESSURE: 63 MMHG | SYSTOLIC BLOOD PRESSURE: 104 MMHG

## 2025-07-14 DIAGNOSIS — Z3A.35 35 WEEKS GESTATION OF PREGNANCY: Primary | ICD-10-CM

## 2025-07-14 LAB — GBS, EXTERNAL RESULT: NEGATIVE

## 2025-07-14 PROCEDURE — 0502F SUBSEQUENT PRENATAL CARE: CPT | Performed by: OBSTETRICS & GYNECOLOGY

## 2025-07-14 NOTE — PROGRESS NOTES
OB VISIT      Current EGA:   35w0d    Visit Notes:  Doing Well. +Fetal Movement.  No Ucs. No LOF or VB.   For GBS    Total weight gain is 8.256 kg (18 lb 3.2 oz).   (Total expected for pregnancy is 11.5 kg (25 lb)-16 kg (35 lb))  Last Weight Metrics:      7/14/2025     1:30 PM 6/24/2025     2:34 PM 6/5/2025     3:04 PM 5/15/2025     1:04 PM 4/17/2025     1:19 PM 1/10/2025     2:45 PM 6/6/2023     4:06 PM   Weight Loss Metrics   Height  5' 4\" 5' 4\"   5' 4\" 5' 4\"   Weight - Scale 145 lbs 10 oz 142 lbs 3 oz 141 lbs 13 oz 141 lbs 6 oz 138 lbs 13 oz 127 lbs 6 oz 127 lbs 6 oz   BMI (Calculated) 0 kg/m2 24.5 kg/m2 24.4 kg/m2 0 kg/m2 0 kg/m2 21.9 kg/m2 21.9 kg/m2        Vitals:    07/14/25 1330   BP: 104/63   Pulse: 78   Resp: 18   Weight: 66 kg (145 lb 9.6 oz)        Problem List:  Dating by 8 week US <> unsure LMP  Going to [place] until 21 weeks.  Understands missing some lab work.    Prediabetes A1c 5.7   Early Glucola  Vit D Def (20)  5000iu  Repeat at 28w 19.  Non compliant  Boy   Elevated glucola (142) Normal 3hr GTT    PN Flowsheet Data:  Fundal Height (cm): 35 cm  Prenatal Fetal Information  Fundal Height (cm): 35 cm  Fetal HR: 140  Movement: Present Albumin: Negative  Glucose: Negative Comments: doing well GBS Dilation (cm): Closed  Effacement: 0  Station: -3     Current Outpatient Medications   Medication Instructions    Cholecalciferol 50 MCG (2000 UT) CAPS DAILY    Ferrous Sulfate Dried (FERROUS SULFATE IRON PO) Take by mouth    Prenatal Vit-Fe Fumarate-FA (PRENATAL PLUS) 27-1 MG TABS 1 tablet, DAILY        Visit Diagnoses:   Diagnosis Orders   1. 35 weeks gestation of pregnancy            Follow Up:  Return in about 2 weeks (around 7/28/2025) for OB Ultrasound, Routine OB.    Asa Bo MD, FACOG  07/14/25  1:46 PM

## 2025-07-17 LAB
GP B STREP DNA SPEC QL NAA+PROBE: NEGATIVE
SPECIMEN SOURCE: NORMAL

## 2025-07-23 DIAGNOSIS — Z3A.37 37 WEEKS GESTATION OF PREGNANCY: ICD-10-CM

## 2025-07-23 DIAGNOSIS — O26.843 UTERINE SIZE DATE DISCREPANCY PREGNANCY, THIRD TRIMESTER: Primary | ICD-10-CM

## 2025-07-28 ENCOUNTER — ROUTINE PRENATAL (OUTPATIENT)
Age: 35
End: 2025-07-28

## 2025-07-28 VITALS
SYSTOLIC BLOOD PRESSURE: 100 MMHG | HEART RATE: 85 BPM | DIASTOLIC BLOOD PRESSURE: 67 MMHG | BODY MASS INDEX: 24.82 KG/M2 | WEIGHT: 144.6 LBS

## 2025-07-28 DIAGNOSIS — E55.9 VITAMIN D DEFICIENCY: ICD-10-CM

## 2025-07-28 DIAGNOSIS — R73.09 ELEVATED GLUCOSE TOLERANCE TEST: ICD-10-CM

## 2025-07-28 DIAGNOSIS — Z3A.37 37 WEEKS GESTATION OF PREGNANCY: Primary | ICD-10-CM

## 2025-07-28 DIAGNOSIS — R73.03 PREDIABETES: ICD-10-CM

## 2025-07-28 PROCEDURE — 0502F SUBSEQUENT PRENATAL CARE: CPT | Performed by: OBSTETRICS & GYNECOLOGY

## 2025-07-28 NOTE — PROGRESS NOTES
OB VISIT      Current EGA:   37w0d    Visit Notes:  Doing Well. +Fetal Movement.  No Ucs. No LOF or VB.   Ultrasound was done today to evaluate these complaints and shows:  US OB FOLLOW UP TRANSABDOMINAL APPROACH  Result Date: 7/28/2025  Transabdominal ultrasound images are reviewed today and demonstrates seaman live intrauterine gestation with active fetal cardiac activity.  Fetal ventricular heart rate is 130 bpm.  Placenta is noted to be anterior grade 2 appears normal.  Amniotic fluid volume is normal with a four-quadrant total of 7.65 cm.  Average fetal biometric measurements today are consistent with 38 weeks and 2 days which confirms previous dating.  Estimated fetal weight is 3362 g (7 pounds 7 ounces) this is 80th percentile by Hadlock criteria.  Individual fetal biometric measurements are as follows: BPD (Hadlock) 9.06 cm 9.06 avg. 57.9% 36w5d OFD (HC) 12.55 cm 12.55 avg. HC (Hadlock) 34.67 cm 34.67 avg. 90.9% 40w1d AC (Hadlock) 33.66 cm * 33.66 avg. 78.4% 37w4d FL (Hadlock) 7.53 cm 7.53 avg. 83.8% 38w4d Limited fetal anatomy is normal.        Total weight gain is 7.802 kg (17 lb 3.2 oz).   (Total expected for pregnancy is 11.5 kg (25 lb)-16 kg (35 lb))  Last Weight Metrics:      7/28/2025     9:04 AM 7/14/2025     1:30 PM 6/24/2025     2:34 PM 6/5/2025     3:04 PM 5/15/2025     1:04 PM 4/17/2025     1:19 PM 1/10/2025     2:45 PM   Weight Loss Metrics   Height   5' 4\" 5' 4\"   5' 4\"   Weight - Scale 144 lbs 10 oz 145 lbs 10 oz 142 lbs 3 oz 141 lbs 13 oz 141 lbs 6 oz 138 lbs 13 oz 127 lbs 6 oz   BMI (Calculated) 0 kg/m2 0 kg/m2 24.5 kg/m2 24.4 kg/m2 0 kg/m2 0 kg/m2 21.9 kg/m2        Vitals:    07/28/25 0904   BP: 100/67   Pulse: 85   Weight: 65.6 kg (144 lb 9.6 oz)        Problem List:  Dating by 8 week US <> unsure LMP  Going to [place] until 21 weeks.  Understands missing some lab work.    Prediabetes A1c 5.7   Early Glucola  Vit D Def (20)  5000iu  Repeat at 28w 19.  Non compliant  Boy   Elevated

## 2025-08-04 ENCOUNTER — ROUTINE PRENATAL (OUTPATIENT)
Age: 35
End: 2025-08-04

## 2025-08-04 VITALS
DIASTOLIC BLOOD PRESSURE: 69 MMHG | RESPIRATION RATE: 18 BRPM | SYSTOLIC BLOOD PRESSURE: 112 MMHG | HEART RATE: 84 BPM | BODY MASS INDEX: 24.89 KG/M2 | WEIGHT: 145 LBS

## 2025-08-04 DIAGNOSIS — Z3A.38 38 WEEKS GESTATION OF PREGNANCY: Primary | ICD-10-CM

## 2025-08-04 PROCEDURE — 0502F SUBSEQUENT PRENATAL CARE: CPT | Performed by: OBSTETRICS & GYNECOLOGY

## 2025-08-12 ENCOUNTER — HOSPITAL ENCOUNTER (INPATIENT)
Facility: HOSPITAL | Age: 35
LOS: 2 days | Discharge: HOME | DRG: 560 | End: 2025-08-14
Attending: STUDENT IN AN ORGANIZED HEALTH CARE EDUCATION/TRAINING PROGRAM | Admitting: OBSTETRICS & GYNECOLOGY
Payer: MEDICAID

## 2025-08-12 ENCOUNTER — ROUTINE PRENATAL (OUTPATIENT)
Age: 35
End: 2025-08-12

## 2025-08-12 VITALS
SYSTOLIC BLOOD PRESSURE: 108 MMHG | HEART RATE: 80 BPM | OXYGEN SATURATION: 99 % | DIASTOLIC BLOOD PRESSURE: 66 MMHG | RESPIRATION RATE: 16 BRPM | BODY MASS INDEX: 24.55 KG/M2 | WEIGHT: 143 LBS

## 2025-08-12 DIAGNOSIS — R52 POSTPARTUM PAIN: Primary | ICD-10-CM

## 2025-08-12 DIAGNOSIS — Z3A.39 39 WEEKS GESTATION OF PREGNANCY: Primary | ICD-10-CM

## 2025-08-12 PROCEDURE — 1120000000 HC RM PRIVATE OB

## 2025-08-12 PROCEDURE — G0378 HOSPITAL OBSERVATION PER HR: HCPCS

## 2025-08-12 PROCEDURE — 7210000100 HC LABOR FEE PER 1 HR: Performed by: FAMILY MEDICINE

## 2025-08-12 PROCEDURE — G0379 DIRECT REFER HOSPITAL OBSERV: HCPCS

## 2025-08-12 PROCEDURE — 0HQ9XZZ REPAIR PERINEUM SKIN, EXTERNAL APPROACH: ICD-10-PCS | Performed by: FAMILY MEDICINE

## 2025-08-12 PROCEDURE — 0502F SUBSEQUENT PRENATAL CARE: CPT | Performed by: OBSTETRICS & GYNECOLOGY

## 2025-08-12 PROCEDURE — 7220000101 HC DELIVERY VAGINAL/SINGLE: Performed by: FAMILY MEDICINE

## 2025-08-12 RX ORDER — POLYETHYLENE GLYCOL 3350 17 G/17G
17 POWDER, FOR SOLUTION ORAL DAILY PRN
Status: DISCONTINUED | OUTPATIENT
Start: 2025-08-12 | End: 2025-08-14 | Stop reason: HOSPADM

## 2025-08-12 RX ORDER — SODIUM CHLORIDE 0.9 % (FLUSH) 0.9 %
5-40 SYRINGE (ML) INJECTION EVERY 12 HOURS SCHEDULED
Status: DISCONTINUED | OUTPATIENT
Start: 2025-08-13 | End: 2025-08-14 | Stop reason: HOSPADM

## 2025-08-12 RX ORDER — SODIUM CHLORIDE 9 MG/ML
INJECTION, SOLUTION INTRAVENOUS PRN
Status: DISCONTINUED | OUTPATIENT
Start: 2025-08-12 | End: 2025-08-14 | Stop reason: HOSPADM

## 2025-08-12 RX ORDER — ONDANSETRON 2 MG/ML
4 INJECTION INTRAMUSCULAR; INTRAVENOUS EVERY 6 HOURS PRN
Status: DISCONTINUED | OUTPATIENT
Start: 2025-08-12 | End: 2025-08-14 | Stop reason: HOSPADM

## 2025-08-12 RX ORDER — PROMETHAZINE HYDROCHLORIDE 25 MG/1
12.5 TABLET ORAL EVERY 6 HOURS PRN
Status: DISCONTINUED | OUTPATIENT
Start: 2025-08-12 | End: 2025-08-14 | Stop reason: HOSPADM

## 2025-08-12 RX ORDER — ACETAMINOPHEN 500 MG
1000 TABLET ORAL EVERY 8 HOURS PRN
Status: DISCONTINUED | OUTPATIENT
Start: 2025-08-12 | End: 2025-08-14 | Stop reason: HOSPADM

## 2025-08-12 RX ORDER — SODIUM CHLORIDE 0.9 % (FLUSH) 0.9 %
5-40 SYRINGE (ML) INJECTION PRN
Status: DISCONTINUED | OUTPATIENT
Start: 2025-08-12 | End: 2025-08-14 | Stop reason: HOSPADM

## 2025-08-12 RX ORDER — LOPERAMIDE HYDROCHLORIDE 2 MG/1
2 CAPSULE ORAL PRN
Status: DISCONTINUED | OUTPATIENT
Start: 2025-08-12 | End: 2025-08-14 | Stop reason: HOSPADM

## 2025-08-12 RX ORDER — IBUPROFEN 800 MG/1
800 TABLET, FILM COATED ORAL EVERY 8 HOURS SCHEDULED
Status: DISCONTINUED | OUTPATIENT
Start: 2025-08-13 | End: 2025-08-14 | Stop reason: HOSPADM

## 2025-08-12 RX ORDER — FAMOTIDINE 20 MG/1
20 TABLET, FILM COATED ORAL 2 TIMES DAILY PRN
Status: DISCONTINUED | OUTPATIENT
Start: 2025-08-12 | End: 2025-08-14 | Stop reason: HOSPADM

## 2025-08-12 RX ORDER — FERROUS SULFATE 325(65) MG
325 TABLET ORAL EVERY OTHER DAY
Status: DISCONTINUED | OUTPATIENT
Start: 2025-08-13 | End: 2025-08-14 | Stop reason: HOSPADM

## 2025-08-12 RX ORDER — SIMETHICONE 80 MG
80 TABLET,CHEWABLE ORAL EVERY 6 HOURS PRN
Status: DISCONTINUED | OUTPATIENT
Start: 2025-08-12 | End: 2025-08-14 | Stop reason: HOSPADM

## 2025-08-12 RX ORDER — DOCUSATE SODIUM 100 MG/1
100 CAPSULE, LIQUID FILLED ORAL 2 TIMES DAILY
Status: DISCONTINUED | OUTPATIENT
Start: 2025-08-13 | End: 2025-08-14 | Stop reason: HOSPADM

## 2025-08-13 LAB
ABO + RH BLD: NORMAL
BASOPHILS # BLD: 0.06 K/UL (ref 0–0.1)
BASOPHILS NFR BLD: 0.4 % (ref 0–1)
BLOOD GROUP ANTIBODIES SERPL: NORMAL
DIFFERENTIAL METHOD BLD: ABNORMAL
EOSINOPHIL # BLD: 0.02 K/UL (ref 0–0.4)
EOSINOPHIL NFR BLD: 0.1 % (ref 0–7)
ERYTHROCYTE [DISTWIDTH] IN BLOOD BY AUTOMATED COUNT: 13.6 % (ref 11.5–14.5)
HCT VFR BLD AUTO: 35.3 % (ref 35–47)
HGB BLD-MCNC: 12.4 G/DL (ref 11.5–16)
IMM GRANULOCYTES # BLD AUTO: 0.13 K/UL (ref 0–0.04)
IMM GRANULOCYTES NFR BLD AUTO: 0.8 % (ref 0–0.5)
LYMPHOCYTES # BLD: 1.03 K/UL (ref 0.8–3.5)
LYMPHOCYTES NFR BLD: 6.1 % (ref 12–49)
MCH RBC QN AUTO: 30.1 PG (ref 26–34)
MCHC RBC AUTO-ENTMCNC: 35.1 G/DL (ref 30–36.5)
MCV RBC AUTO: 85.7 FL (ref 80–99)
MONOCYTES # BLD: 0.66 K/UL (ref 0–1)
MONOCYTES NFR BLD: 3.9 % (ref 5–13)
NEUTS SEG # BLD: 14.95 K/UL (ref 1.8–8)
NEUTS SEG NFR BLD: 88.7 % (ref 32–75)
NRBC # BLD: 0 K/UL (ref 0–0.01)
NRBC BLD-RTO: 0 PER 100 WBC
PLATELET # BLD AUTO: 208 K/UL (ref 150–400)
PMV BLD AUTO: 11.6 FL (ref 8.9–12.9)
RBC # BLD AUTO: 4.12 M/UL (ref 3.8–5.2)
SPECIMEN EXP DATE BLD: NORMAL
WBC # BLD AUTO: 16.9 K/UL (ref 3.6–11)

## 2025-08-13 PROCEDURE — 6370000000 HC RX 637 (ALT 250 FOR IP): Performed by: FAMILY MEDICINE

## 2025-08-13 PROCEDURE — 1120000000 HC RM PRIVATE OB

## 2025-08-13 PROCEDURE — 94761 N-INVAS EAR/PLS OXIMETRY MLT: CPT

## 2025-08-13 PROCEDURE — 86901 BLOOD TYPING SEROLOGIC RH(D): CPT

## 2025-08-13 PROCEDURE — 86850 RBC ANTIBODY SCREEN: CPT

## 2025-08-13 PROCEDURE — 85025 COMPLETE CBC W/AUTO DIFF WBC: CPT

## 2025-08-13 PROCEDURE — 86900 BLOOD TYPING SEROLOGIC ABO: CPT

## 2025-08-13 PROCEDURE — 36415 COLL VENOUS BLD VENIPUNCTURE: CPT

## 2025-08-13 RX ORDER — PSEUDOEPHEDRINE HCL 30 MG
100 TABLET ORAL 2 TIMES DAILY
Qty: 30 CAPSULE | Refills: 0 | Status: SHIPPED | OUTPATIENT
Start: 2025-08-13

## 2025-08-13 RX ORDER — MULTIVIT-MIN/IRON/FOLIC ACID/K 18-600-40
2000 CAPSULE ORAL DAILY
COMMUNITY

## 2025-08-13 RX ORDER — MULTIVITAMIN WITH IRON
1 TABLET ORAL DAILY
COMMUNITY

## 2025-08-13 RX ORDER — IBUPROFEN 800 MG/1
800 TABLET, FILM COATED ORAL EVERY 8 HOURS SCHEDULED
Qty: 60 TABLET | Refills: 0 | Status: SHIPPED | OUTPATIENT
Start: 2025-08-13

## 2025-08-13 RX ORDER — FERROUS SULFATE 325(65) MG
325 TABLET ORAL
COMMUNITY

## 2025-08-13 RX ADMIN — DOCUSATE SODIUM 100 MG: 100 CAPSULE, LIQUID FILLED ORAL at 08:41

## 2025-08-13 RX ADMIN — DOCUSATE SODIUM 100 MG: 100 CAPSULE, LIQUID FILLED ORAL at 21:52

## 2025-08-13 RX ADMIN — IBUPROFEN 800 MG: 800 TABLET ORAL at 08:43

## 2025-08-13 RX ADMIN — IBUPROFEN 800 MG: 800 TABLET ORAL at 00:18

## 2025-08-13 RX ADMIN — FERROUS SULFATE TAB 325 MG (65 MG ELEMENTAL FE) 325 MG: 325 (65 FE) TAB at 08:40

## 2025-08-13 RX ADMIN — ACETAMINOPHEN 1000 MG: 500 TABLET ORAL at 08:43

## 2025-08-13 RX ADMIN — IBUPROFEN 800 MG: 800 TABLET ORAL at 21:53

## 2025-08-13 RX ADMIN — ACETAMINOPHEN 1000 MG: 500 TABLET ORAL at 00:50

## 2025-08-13 ASSESSMENT — PAIN - FUNCTIONAL ASSESSMENT
PAIN_FUNCTIONAL_ASSESSMENT: PREVENTS OR INTERFERES SOME ACTIVE ACTIVITIES AND ADLS
PAIN_FUNCTIONAL_ASSESSMENT: ACTIVITIES ARE NOT PREVENTED
PAIN_FUNCTIONAL_ASSESSMENT: 0-10

## 2025-08-13 ASSESSMENT — PAIN DESCRIPTION - LOCATION
LOCATION: ABDOMEN
LOCATION: PELVIS
LOCATION: PELVIS
LOCATION: ABDOMEN

## 2025-08-13 ASSESSMENT — PAIN SCALES - GENERAL
PAINLEVEL_OUTOF10: 5
PAINLEVEL_OUTOF10: 2
PAINLEVEL_OUTOF10: 4
PAINLEVEL_OUTOF10: 5

## 2025-08-13 ASSESSMENT — PAIN DESCRIPTION - ORIENTATION
ORIENTATION: LOWER
ORIENTATION: ANTERIOR
ORIENTATION: LOWER
ORIENTATION: ANTERIOR

## 2025-08-13 ASSESSMENT — PAIN DESCRIPTION - DESCRIPTORS
DESCRIPTORS: CRAMPING
DESCRIPTORS: DISCOMFORT
DESCRIPTORS: ACHING;CRAMPING;SORE
DESCRIPTORS: CRAMPING

## 2025-08-14 VITALS
TEMPERATURE: 97.9 F | HEART RATE: 89 BPM | OXYGEN SATURATION: 97 % | RESPIRATION RATE: 16 BRPM | SYSTOLIC BLOOD PRESSURE: 114 MMHG | DIASTOLIC BLOOD PRESSURE: 78 MMHG

## 2025-08-14 PROBLEM — Z3A.39 39 WEEKS GESTATION OF PREGNANCY: Status: ACTIVE | Noted: 2025-08-14

## 2025-08-14 PROCEDURE — 6370000000 HC RX 637 (ALT 250 FOR IP): Performed by: FAMILY MEDICINE

## 2025-08-14 PROCEDURE — 36415 COLL VENOUS BLD VENIPUNCTURE: CPT

## 2025-08-14 PROCEDURE — 86780 TREPONEMA PALLIDUM: CPT

## 2025-08-14 RX ORDER — OXYCODONE AND ACETAMINOPHEN 5; 325 MG/1; MG/1
1 TABLET ORAL EVERY 6 HOURS PRN
Qty: 10 TABLET | Refills: 0 | Status: SHIPPED | OUTPATIENT
Start: 2025-08-14 | End: 2025-08-21

## 2025-08-14 RX ADMIN — DOCUSATE SODIUM 100 MG: 100 CAPSULE, LIQUID FILLED ORAL at 08:11

## 2025-08-14 RX ADMIN — IBUPROFEN 800 MG: 800 TABLET ORAL at 14:21

## 2025-08-14 RX ADMIN — IBUPROFEN 800 MG: 800 TABLET ORAL at 06:17

## 2025-08-14 ASSESSMENT — PAIN DESCRIPTION - ORIENTATION
ORIENTATION: ANTERIOR
ORIENTATION: LOWER

## 2025-08-14 ASSESSMENT — PAIN - FUNCTIONAL ASSESSMENT
PAIN_FUNCTIONAL_ASSESSMENT: ACTIVITIES ARE NOT PREVENTED
PAIN_FUNCTIONAL_ASSESSMENT: 0-10
PAIN_FUNCTIONAL_ASSESSMENT: 0-10

## 2025-08-14 ASSESSMENT — PAIN DESCRIPTION - DESCRIPTORS
DESCRIPTORS: CRAMPING
DESCRIPTORS: ACHING;CRAMPING

## 2025-08-14 ASSESSMENT — PAIN DESCRIPTION - LOCATION
LOCATION: ABDOMEN
LOCATION: ABDOMEN;PERINEUM

## 2025-08-14 ASSESSMENT — PAIN SCALES - GENERAL
PAINLEVEL_OUTOF10: 3
PAINLEVEL_OUTOF10: 4

## 2025-08-15 LAB
INTERPRETATION: NORMAL
T PALLIDUM AB SER QL IA: NON REACTIVE